# Patient Record
Sex: FEMALE | Race: WHITE | HISPANIC OR LATINO | Employment: UNEMPLOYED | URBAN - METROPOLITAN AREA
[De-identification: names, ages, dates, MRNs, and addresses within clinical notes are randomized per-mention and may not be internally consistent; named-entity substitution may affect disease eponyms.]

---

## 2018-05-01 ENCOUNTER — HISTORICAL (OUTPATIENT)
Dept: INTERNAL MEDICINE | Facility: CLINIC | Age: 28
End: 2018-05-01

## 2018-05-01 LAB
ABS NEUT (OLG): 4.27 X10(3)/MCL (ref 2.1–9.2)
ALBUMIN SERPL-MCNC: 4 GM/DL (ref 3.4–5)
ALBUMIN/GLOB SERPL: 1 RATIO (ref 1–2)
ALP SERPL-CCNC: 85 UNIT/L (ref 45–117)
ALT SERPL-CCNC: 25 UNIT/L (ref 12–78)
APPEARANCE, UA: CLEAR
AST SERPL-CCNC: 19 UNIT/L (ref 15–37)
BACTERIA #/AREA URNS AUTO: ABNORMAL /[HPF]
BASOPHILS # BLD AUTO: 0.05 X10(3)/MCL
BASOPHILS NFR BLD AUTO: 1 %
BILIRUB SERPL-MCNC: 0.5 MG/DL (ref 0.2–1)
BILIRUB UR QL STRIP: NEGATIVE
BILIRUBIN DIRECT+TOT PNL SERPL-MCNC: 0.2 MG/DL
BILIRUBIN DIRECT+TOT PNL SERPL-MCNC: 0.3 MG/DL
BUN SERPL-MCNC: 9 MG/DL (ref 7–18)
CALCIUM SERPL-MCNC: 8.8 MG/DL (ref 8.5–10.1)
CHLORIDE SERPL-SCNC: 103 MMOL/L (ref 98–107)
CHOLEST SERPL-MCNC: 145 MG/DL
CHOLEST/HDLC SERPL: 3 {RATIO} (ref 0–4.4)
CO2 SERPL-SCNC: 26 MMOL/L (ref 21–32)
COLOR UR: ABNORMAL
CREAT SERPL-MCNC: 0.6 MG/DL (ref 0.6–1.3)
DEPRECATED CALCIDIOL+CALCIFEROL SERPL-MC: 29.79 NG/ML (ref 30–80)
EOSINOPHIL # BLD AUTO: 0.09 10*3/UL
EOSINOPHIL NFR BLD AUTO: 1 %
ERYTHROCYTE [DISTWIDTH] IN BLOOD BY AUTOMATED COUNT: 12.1 % (ref 11.5–14.5)
EST. AVERAGE GLUCOSE BLD GHB EST-MCNC: 91 MG/DL
GLOBULIN SER-MCNC: 3.8 GM/ML (ref 2.3–3.5)
GLUCOSE (UA): NORMAL
GLUCOSE SERPL-MCNC: 83 MG/DL (ref 74–106)
HBA1C MFR BLD: 4.8 % (ref 4.2–6.3)
HCT VFR BLD AUTO: 43.2 % (ref 35–46)
HDLC SERPL-MCNC: 49 MG/DL
HGB BLD-MCNC: 14.4 GM/DL (ref 12–16)
HGB UR QL STRIP: 0.03 MG/DL
HIV 1+2 AB+HIV1 P24 AG SERPL QL IA: NONREACTIVE
HYALINE CASTS #/AREA URNS LPF: ABNORMAL /[LPF]
IMM GRANULOCYTES # BLD AUTO: 0.01 10*3/UL
IMM GRANULOCYTES NFR BLD AUTO: 0 %
KETONES UR QL STRIP: NEGATIVE
LDLC SERPL CALC-MCNC: 80 MG/DL (ref 0–130)
LEUKOCYTE ESTERASE UR QL STRIP: NEGATIVE
LYMPHOCYTES # BLD AUTO: 2.06 X10(3)/MCL
LYMPHOCYTES NFR BLD AUTO: 30 % (ref 13–40)
MCH RBC QN AUTO: 32.7 PG (ref 26–34)
MCHC RBC AUTO-ENTMCNC: 33.3 GM/DL (ref 31–37)
MCV RBC AUTO: 98 FL (ref 80–100)
MONOCYTES # BLD AUTO: 0.39 X10(3)/MCL
MONOCYTES NFR BLD AUTO: 6 % (ref 4–12)
NEUTROPHILS # BLD AUTO: 4.27 X10(3)/MCL
NEUTROPHILS NFR BLD AUTO: 62 X10(3)/MCL
NITRITE UR QL STRIP: NEGATIVE
PH UR STRIP: 6.5 [PH] (ref 4.5–8)
PLATELET # BLD AUTO: 264 X10(3)/MCL (ref 130–400)
PMV BLD AUTO: 10.7 FL (ref 7.4–10.4)
POTASSIUM SERPL-SCNC: 3.9 MMOL/L (ref 3.5–5.1)
PROT SERPL-MCNC: 7.8 GM/DL (ref 6.4–8.2)
PROT UR QL STRIP: NEGATIVE
RBC # BLD AUTO: 4.41 X10(6)/MCL (ref 4–5.2)
RBC #/AREA URNS AUTO: ABNORMAL /[HPF]
SODIUM SERPL-SCNC: 135 MMOL/L (ref 136–145)
SP GR UR STRIP: 1 (ref 1–1.03)
SQUAMOUS #/AREA URNS LPF: ABNORMAL /[LPF]
T4 SERPL-MCNC: 9.8 MCG/DL (ref 4.8–13.9)
TRIGL SERPL-MCNC: 80 MG/DL
TSH SERPL-ACNC: 1.11 MIU/L (ref 0.36–3.74)
UROBILINOGEN UR STRIP-ACNC: NORMAL
VLDLC SERPL CALC-MCNC: 16 MG/DL
WBC # SPEC AUTO: 6.9 X10(3)/MCL (ref 4.5–11)
WBC #/AREA URNS AUTO: ABNORMAL /HPF

## 2019-02-06 LAB — POC BETA-HCG (QUAL): NEGATIVE

## 2019-08-29 ENCOUNTER — HISTORICAL (OUTPATIENT)
Dept: RADIOLOGY | Facility: HOSPITAL | Age: 29
End: 2019-08-29

## 2019-12-20 ENCOUNTER — HISTORICAL (OUTPATIENT)
Dept: ADMINISTRATIVE | Facility: HOSPITAL | Age: 29
End: 2019-12-20

## 2019-12-23 LAB — FINAL CULTURE: NORMAL

## 2020-02-03 ENCOUNTER — HISTORICAL (OUTPATIENT)
Dept: RADIOLOGY | Facility: HOSPITAL | Age: 30
End: 2020-02-03

## 2020-04-16 ENCOUNTER — HISTORICAL (OUTPATIENT)
Dept: RADIOLOGY | Facility: HOSPITAL | Age: 30
End: 2020-04-16

## 2020-05-05 ENCOUNTER — HISTORICAL (OUTPATIENT)
Dept: ADMINISTRATIVE | Facility: HOSPITAL | Age: 30
End: 2020-05-05

## 2020-05-05 ENCOUNTER — HISTORICAL (OUTPATIENT)
Dept: INTERNAL MEDICINE | Facility: CLINIC | Age: 30
End: 2020-05-05

## 2020-05-05 LAB
ABS NEUT (OLG): 3.98 X10(3)/MCL (ref 2.1–9.2)
ALBUMIN SERPL-MCNC: 4.1 GM/DL (ref 3.4–5)
ALBUMIN/GLOB SERPL: 0.9 RATIO (ref 1.1–2)
ALP SERPL-CCNC: 83 UNIT/L (ref 45–117)
ALT SERPL-CCNC: 23 UNIT/L (ref 12–78)
APPEARANCE, UA: CLEAR
AST SERPL-CCNC: 19 UNIT/L (ref 15–37)
BACTERIA #/AREA URNS AUTO: ABNORMAL /HPF
BASOPHILS # BLD AUTO: 0 X10(3)/MCL (ref 0–0.2)
BASOPHILS NFR BLD AUTO: 1 %
BILIRUB SERPL-MCNC: 0.7 MG/DL (ref 0.2–1)
BILIRUB UR QL STRIP: NEGATIVE
BILIRUBIN DIRECT+TOT PNL SERPL-MCNC: 0.1 MG/DL (ref 0–0.2)
BILIRUBIN DIRECT+TOT PNL SERPL-MCNC: 0.6 MG/DL
BUN SERPL-MCNC: 11 MG/DL (ref 7–18)
CALCIUM SERPL-MCNC: 9.2 MG/DL (ref 8.5–10.1)
CHLORIDE SERPL-SCNC: 107 MMOL/L (ref 98–107)
CHOLEST SERPL-MCNC: 200 MG/DL
CHOLEST/HDLC SERPL: 3.4 {RATIO} (ref 0–4.4)
CO2 SERPL-SCNC: 25 MMOL/L (ref 21–32)
COLOR UR: YELLOW
CREAT SERPL-MCNC: 0.7 MG/DL (ref 0.6–1.3)
DEPRECATED CALCIDIOL+CALCIFEROL SERPL-MC: 31.1 NG/ML (ref 30–80)
EOSINOPHIL # BLD AUTO: 0.1 X10(3)/MCL (ref 0–0.9)
EOSINOPHIL NFR BLD AUTO: 1 %
ERYTHROCYTE [DISTWIDTH] IN BLOOD BY AUTOMATED COUNT: 12.5 % (ref 11.5–14.5)
GLOBULIN SER-MCNC: 4.5 GM/ML (ref 2.3–3.5)
GLUCOSE (UA): NEGATIVE
GLUCOSE SERPL-MCNC: 92 MG/DL (ref 74–106)
HCT VFR BLD AUTO: 44.4 % (ref 35–46)
HDLC SERPL-MCNC: 59 MG/DL (ref 40–59)
HGB BLD-MCNC: 14.7 GM/DL (ref 12–16)
HGB UR QL STRIP: 0.03 MG/DL
HIV 1+2 AB+HIV1 P24 AG SERPL QL IA: NONREACTIVE
HYALINE CASTS #/AREA URNS LPF: ABNORMAL /LPF
IMM GRANULOCYTES # BLD AUTO: 0.01 10*3/UL
IMM GRANULOCYTES NFR BLD AUTO: 0 %
KETONES UR QL STRIP: NEGATIVE
LDLC SERPL CALC-MCNC: 125 MG/DL
LEUKOCYTE ESTERASE UR QL STRIP: 75 LEU/UL
LYMPHOCYTES # BLD AUTO: 1.8 X10(3)/MCL (ref 0.6–4.6)
LYMPHOCYTES NFR BLD AUTO: 28 %
MCH RBC QN AUTO: 32.4 PG (ref 26–34)
MCHC RBC AUTO-ENTMCNC: 33.1 GM/DL (ref 31–37)
MCV RBC AUTO: 97.8 FL (ref 80–100)
MONOCYTES # BLD AUTO: 0.4 X10(3)/MCL (ref 0.1–1.3)
MONOCYTES NFR BLD AUTO: 7 %
NEUTROPHILS # BLD AUTO: 3.98 X10(3)/MCL (ref 2.1–9.2)
NEUTROPHILS NFR BLD AUTO: 62 %
NITRITE UR QL STRIP: NEGATIVE
PH UR STRIP: 7 [PH] (ref 4.5–8)
PLATELET # BLD AUTO: 237 X10(3)/MCL (ref 130–400)
PMV BLD AUTO: 10 FL (ref 7.4–10.4)
POTASSIUM SERPL-SCNC: 3.8 MMOL/L (ref 3.5–5.1)
PROT SERPL-MCNC: 8.6 GM/DL (ref 6.4–8.2)
PROT UR QL STRIP: 10 MG/DL
RBC # BLD AUTO: 4.54 X10(6)/MCL (ref 4–5.2)
RBC #/AREA URNS AUTO: ABNORMAL /HPF
SODIUM SERPL-SCNC: 136 MMOL/L (ref 136–145)
SP GR UR STRIP: 1.02 (ref 1–1.03)
SQUAMOUS #/AREA URNS LPF: ABNORMAL /LPF
T4 SERPL-MCNC: 9.5 MCG/DL (ref 4.8–13.9)
TRIGL SERPL-MCNC: 82 MG/DL
TSH SERPL-ACNC: 1.51 MIU/L (ref 0.36–3.74)
UROBILINOGEN UR STRIP-ACNC: NORMAL
VLDLC SERPL CALC-MCNC: 16 MG/DL
WBC # SPEC AUTO: 6.4 X10(3)/MCL (ref 4.5–11)
WBC #/AREA URNS AUTO: ABNORMAL /HPF

## 2020-05-07 LAB — FINAL CULTURE: NO GROWTH

## 2020-05-08 ENCOUNTER — HOSPITAL ENCOUNTER (OUTPATIENT)
Dept: MEDSURG UNIT | Facility: HOSPITAL | Age: 30
End: 2020-05-09
Attending: OTOLARYNGOLOGY | Admitting: OTOLARYNGOLOGY

## 2020-05-08 LAB
ALBUMIN SERPL-MCNC: 3.6 GM/DL (ref 3.4–5)
CALCIUM SERPL-MCNC: 8.1 MG/DL (ref 8.5–10.1)
MAGNESIUM SERPL-MCNC: 1.8 MG/DL (ref 1.6–2.6)
PTH-INTACT SERPL-MCNC: <4 PG/ML (ref 18.4–80.1)

## 2020-05-09 LAB
ALBUMIN SERPL-MCNC: 3.2 GM/DL (ref 3.4–5)
BUN SERPL-MCNC: 8 MG/DL (ref 7–18)
CALCIUM SERPL-MCNC: 7.5 MG/DL (ref 8.5–10.1)
CALCIUM SERPL-MCNC: 7.7 MG/DL (ref 8.5–10.1)
CHLORIDE SERPL-SCNC: 104 MMOL/L (ref 98–107)
CO2 SERPL-SCNC: 28 MMOL/L (ref 21–32)
CREAT SERPL-MCNC: 0.7 MG/DL (ref 0.6–1.3)
CREAT/UREA NIT SERPL: 11
GLUCOSE SERPL-MCNC: 101 MG/DL (ref 74–106)
POTASSIUM SERPL-SCNC: 3.6 MMOL/L (ref 3.5–5.1)
SODIUM SERPL-SCNC: 140 MMOL/L (ref 136–145)

## 2020-05-12 ENCOUNTER — HISTORICAL (OUTPATIENT)
Dept: ADMINISTRATIVE | Facility: HOSPITAL | Age: 30
End: 2020-05-12

## 2020-05-12 LAB
ALBUMIN SERPL-MCNC: 3.9 GM/DL (ref 3.4–5)
CALCIUM SERPL-MCNC: 7.5 MG/DL (ref 8.5–10.1)
MAGNESIUM SERPL-MCNC: 2 MG/DL (ref 1.6–2.6)
PHOSPHATE SERPL-MCNC: 5.3 MG/DL (ref 2.5–4.9)

## 2020-06-18 ENCOUNTER — HISTORICAL (OUTPATIENT)
Dept: LAB | Facility: HOSPITAL | Age: 30
End: 2020-06-18

## 2020-06-18 LAB
ALBUMIN SERPL-MCNC: 3.8 GM/DL (ref 3.4–5)
BUN SERPL-MCNC: 8 MG/DL (ref 7–18)
CALCIUM SERPL-MCNC: 7.5 MG/DL (ref 8.5–10.1)
CHLORIDE SERPL-SCNC: 103 MMOL/L (ref 98–107)
CO2 SERPL-SCNC: 31 MMOL/L (ref 21–32)
CREAT SERPL-MCNC: 0.6 MG/DL (ref 0.6–1.3)
GLUCOSE SERPL-MCNC: 87 MG/DL (ref 74–106)
PHOSPHATE SERPL-MCNC: 4 MG/DL (ref 2.5–4.9)
POTASSIUM SERPL-SCNC: 3.5 MMOL/L (ref 3.5–5.1)
SODIUM SERPL-SCNC: 138 MMOL/L (ref 136–145)
TSH SERPL-ACNC: 0.38 MIU/L (ref 0.36–3.74)

## 2020-06-22 ENCOUNTER — HISTORICAL (OUTPATIENT)
Dept: ADMINISTRATIVE | Facility: HOSPITAL | Age: 30
End: 2020-06-22

## 2020-06-22 LAB
ABS NEUT (OLG): 4.27 X10(3)/MCL (ref 2.1–9.2)
ALBUMIN SERPL-MCNC: 3.9 GM/DL (ref 3.4–5)
ALBUMIN/GLOB SERPL: 1 RATIO (ref 1.1–2)
ALP SERPL-CCNC: 93 UNIT/L (ref 45–117)
ALT SERPL-CCNC: 26 UNIT/L (ref 12–78)
APPEARANCE, UA: CLEAR
AST SERPL-CCNC: 17 UNIT/L (ref 15–37)
BACTERIA #/AREA URNS AUTO: ABNORMAL /HPF
BASOPHILS # BLD AUTO: 0 X10(3)/MCL (ref 0–0.2)
BASOPHILS NFR BLD AUTO: 1 %
BILIRUB SERPL-MCNC: 0.5 MG/DL (ref 0.2–1)
BILIRUB UR QL STRIP: ABNORMAL MG/DL
BILIRUBIN DIRECT+TOT PNL SERPL-MCNC: 0.1 MG/DL (ref 0–0.2)
BILIRUBIN DIRECT+TOT PNL SERPL-MCNC: 0.4 MG/DL
BUN SERPL-MCNC: 9 MG/DL (ref 7–18)
CALCIUM SERPL-MCNC: 7.4 MG/DL (ref 8.5–10.1)
CHLORIDE SERPL-SCNC: 105 MMOL/L (ref 98–107)
CO2 SERPL-SCNC: 28 MMOL/L (ref 21–32)
COLOR UR: YELLOW
CREAT SERPL-MCNC: 0.7 MG/DL (ref 0.6–1.3)
EOSINOPHIL # BLD AUTO: 0.2 X10(3)/MCL (ref 0–0.9)
EOSINOPHIL NFR BLD AUTO: 3 %
ERYTHROCYTE [DISTWIDTH] IN BLOOD BY AUTOMATED COUNT: 12.1 % (ref 11.5–14.5)
GLOBULIN SER-MCNC: 4 GM/ML (ref 2.3–3.5)
GLUCOSE (UA): ABNORMAL MG/DL
GLUCOSE SERPL-MCNC: 92 MG/DL (ref 74–106)
HCT VFR BLD AUTO: 44.1 % (ref 35–46)
HGB BLD-MCNC: 14.5 GM/DL (ref 12–16)
HGB UR QL STRIP: 0.06 MG/DL
HYALINE CASTS #/AREA URNS LPF: ABNORMAL /LPF
IMM GRANULOCYTES # BLD AUTO: 0.01 10*3/UL
IMM GRANULOCYTES NFR BLD AUTO: 0 %
KETONES UR QL STRIP: ABNORMAL MG/DL
LEUKOCYTE ESTERASE UR QL STRIP: 500 LEU/UL
LYMPHOCYTES # BLD AUTO: 2 X10(3)/MCL (ref 0.6–4.6)
LYMPHOCYTES NFR BLD AUTO: 29 %
MCH RBC QN AUTO: 31.9 PG (ref 26–34)
MCHC RBC AUTO-ENTMCNC: 32.9 GM/DL (ref 31–37)
MCV RBC AUTO: 96.9 FL (ref 80–100)
MONOCYTES # BLD AUTO: 0.6 X10(3)/MCL (ref 0.1–1.3)
MONOCYTES NFR BLD AUTO: 8 %
NEUTROPHILS # BLD AUTO: 4.27 X10(3)/MCL (ref 2.1–9.2)
NEUTROPHILS NFR BLD AUTO: 60 %
NITRITE UR QL STRIP: ABNORMAL
PH UR STRIP: 6 [PH] (ref 4.5–8)
PHOSPHATE SERPL-MCNC: 5 MG/DL (ref 2.5–4.9)
PLATELET # BLD AUTO: 261 X10(3)/MCL (ref 130–400)
PMV BLD AUTO: 10.2 FL (ref 7.4–10.4)
POTASSIUM SERPL-SCNC: 3.5 MMOL/L (ref 3.5–5.1)
PROT SERPL-MCNC: 7.9 GM/DL (ref 6.4–8.2)
PROT UR QL STRIP: 20 MG/DL
RBC # BLD AUTO: 4.55 X10(6)/MCL (ref 4–5.2)
RBC #/AREA URNS AUTO: ABNORMAL /HPF
SODIUM SERPL-SCNC: 138 MMOL/L (ref 136–145)
SP GR UR STRIP: 1.03 (ref 1–1.03)
SQUAMOUS #/AREA URNS LPF: ABNORMAL /LPF
T3FREE SERPL-MCNC: 3.34 PG/ML (ref 1.71–3.71)
T4 FREE SERPL-MCNC: 1 NG/DL (ref 0.7–1.48)
TSH SERPL-ACNC: 0.54 MIU/L (ref 0.36–3.74)
UROBILINOGEN UR STRIP-ACNC: NORMAL
WBC # SPEC AUTO: 7.1 X10(3)/MCL (ref 4.5–11)
WBC #/AREA URNS AUTO: ABNORMAL /HPF

## 2020-06-24 LAB — FINAL CULTURE: NO GROWTH

## 2020-06-29 ENCOUNTER — HISTORICAL (OUTPATIENT)
Dept: ADMINISTRATIVE | Facility: HOSPITAL | Age: 30
End: 2020-06-29

## 2020-06-29 LAB
ALBUMIN SERPL-MCNC: 4 GM/DL (ref 3.4–5)
BUN SERPL-MCNC: 9 MG/DL (ref 7–18)
CALCIUM SERPL-MCNC: 8.2 MG/DL (ref 8.5–10.1)
CHLORIDE SERPL-SCNC: 103 MMOL/L (ref 98–107)
CO2 SERPL-SCNC: 28 MMOL/L (ref 21–32)
CREAT SERPL-MCNC: 0.6 MG/DL (ref 0.6–1.3)
GLUCOSE SERPL-MCNC: 75 MG/DL (ref 74–106)
PHOSPHATE SERPL-MCNC: 3.6 MG/DL (ref 2.5–4.9)
POTASSIUM SERPL-SCNC: 4 MMOL/L (ref 3.5–5.1)
SODIUM SERPL-SCNC: 137 MMOL/L (ref 136–145)

## 2020-07-09 ENCOUNTER — HISTORICAL (OUTPATIENT)
Dept: INTERNAL MEDICINE | Facility: CLINIC | Age: 30
End: 2020-07-09

## 2020-07-09 LAB
APPEARANCE, UA: CLEAR
BACTERIA #/AREA URNS AUTO: ABNORMAL /HPF
BILIRUB UR QL STRIP: NEGATIVE
COLOR UR: ABNORMAL
GLUCOSE (UA): NEGATIVE
HGB UR QL STRIP: 0.03 MG/DL
HYALINE CASTS #/AREA URNS LPF: ABNORMAL /LPF
KETONES UR QL STRIP: NEGATIVE
LEUKOCYTE ESTERASE UR QL STRIP: 250 LEU/UL
NITRITE UR QL STRIP: NEGATIVE
PH UR STRIP: 7.5 [PH] (ref 4.5–8)
PROT UR QL STRIP: NEGATIVE
RBC #/AREA URNS AUTO: ABNORMAL /HPF
SP GR UR STRIP: 1.01 (ref 1–1.03)
SQUAMOUS #/AREA URNS LPF: ABNORMAL /LPF
UROBILINOGEN UR STRIP-ACNC: NORMAL
WBC #/AREA URNS AUTO: ABNORMAL /HPF

## 2020-07-11 LAB — FINAL CULTURE: NORMAL

## 2020-07-12 ENCOUNTER — HISTORICAL (OUTPATIENT)
Dept: ADMINISTRATIVE | Facility: HOSPITAL | Age: 30
End: 2020-07-12

## 2020-07-12 LAB
CALCIUM 24H UR-MCNC: 228 MG/24HR (ref 0–250)
CREAT 24H UR-MCNC: 0.9 GM/24HR (ref 0.6–2.1)
CREAT UR-MCNC: 30 MG/DL (ref 47–110)

## 2020-08-25 ENCOUNTER — HISTORICAL (OUTPATIENT)
Dept: LAB | Facility: HOSPITAL | Age: 30
End: 2020-08-25

## 2020-08-25 ENCOUNTER — HISTORICAL (OUTPATIENT)
Dept: ADMINISTRATIVE | Facility: HOSPITAL | Age: 30
End: 2020-08-25

## 2020-08-25 LAB
ALBUMIN SERPL-MCNC: 3.8 GM/DL (ref 3.4–5)
B-HCG SERPL QL: NEGATIVE
BUN SERPL-MCNC: 10 MG/DL (ref 7–18)
CALCIUM SERPL-MCNC: 7.5 MG/DL (ref 8.5–10.1)
CHLORIDE SERPL-SCNC: 104 MMOL/L (ref 98–107)
CO2 SERPL-SCNC: 27 MMOL/L (ref 21–32)
CREAT SERPL-MCNC: 0.7 MG/DL (ref 0.6–1.3)
GLUCOSE SERPL-MCNC: 87 MG/DL (ref 74–106)
PHOSPHATE SERPL-MCNC: 3.5 MG/DL (ref 2.5–4.9)
POTASSIUM SERPL-SCNC: 3.9 MMOL/L (ref 3.5–5.1)
SODIUM SERPL-SCNC: 139 MMOL/L (ref 136–145)
T4 FREE SERPL-MCNC: 1.22 NG/DL (ref 0.76–1.46)
TSH SERPL-ACNC: 0.09 MIU/L (ref 0.36–3.74)

## 2020-09-03 LAB
BILIRUB SERPL-MCNC: NEGATIVE MG/DL
BLOOD URINE, POC: NORMAL
CLARITY, POC UA: CLEAR
COLOR, POC UA: YELLOW
GLUCOSE UR QL STRIP: NEGATIVE
KETONES UR QL STRIP: NEGATIVE
LEUKOCYTE EST, POC UA: NEGATIVE
NITRITE, POC UA: NEGATIVE
PH, POC UA: 7.5
POC BETA-HCG (QUAL): NEGATIVE
PROTEIN, POC: NEGATIVE
SPECIFIC GRAVITY, POC UA: 1.01
UROBILINOGEN, POC UA: NORMAL

## 2020-10-26 ENCOUNTER — HISTORICAL (OUTPATIENT)
Dept: ADMINISTRATIVE | Facility: HOSPITAL | Age: 30
End: 2020-10-26

## 2020-10-26 LAB
ALBUMIN SERPL-MCNC: 4.1 GM/DL (ref 3.5–5)
BUN SERPL-MCNC: 13 MG/DL (ref 7–18.7)
CALCIUM SERPL-MCNC: 8 MG/DL (ref 8.4–10.2)
CHLORIDE SERPL-SCNC: 103 MMOL/L (ref 98–107)
CO2 SERPL-SCNC: 29 MMOL/L (ref 22–29)
CREAT SERPL-MCNC: 0.8 MG/DL (ref 0.55–1.02)
GLUCOSE SERPL-MCNC: 92 MG/DL (ref 74–100)
PHOSPHATE SERPL-MCNC: 3.7 MG/DL (ref 2.3–4.7)
POTASSIUM SERPL-SCNC: 4.3 MMOL/L (ref 3.5–5.1)
SODIUM SERPL-SCNC: 138 MMOL/L (ref 136–145)
T4 FREE SERPL-MCNC: 1.15 NG/DL (ref 0.7–1.48)
TSH SERPL-ACNC: 0.16 UIU/ML (ref 0.35–4.94)

## 2020-11-03 LAB
CHLAMYDIA TRACHOMATIS CULTURE: NEGATIVE
NEISSERIA GONORRHOEAE (GC) CULTURE: NEGATIVE
PAP RECOMMENDATION EXT: NORMAL
PAP SMEAR: NORMAL

## 2021-03-30 ENCOUNTER — HISTORICAL (OUTPATIENT)
Dept: RADIOLOGY | Facility: HOSPITAL | Age: 31
End: 2021-03-30

## 2021-04-01 ENCOUNTER — HISTORICAL (OUTPATIENT)
Dept: ADMINISTRATIVE | Facility: HOSPITAL | Age: 31
End: 2021-04-01

## 2021-04-01 LAB
ALBUMIN SERPL-MCNC: 3.5 GM/DL (ref 3.5–5)
BUN SERPL-MCNC: 10.1 MG/DL (ref 7–18.7)
CALCIUM SERPL-MCNC: 9.7 MG/DL (ref 8.4–10.2)
CHLORIDE SERPL-SCNC: 99 MMOL/L (ref 98–107)
CO2 SERPL-SCNC: 27 MMOL/L (ref 22–29)
CREAT SERPL-MCNC: 0.71 MG/DL (ref 0.55–1.02)
DEPRECATED CALCIDIOL+CALCIFEROL SERPL-MC: 57.3 NG/ML (ref 30–80)
GLUCOSE SERPL-MCNC: 75 MG/DL (ref 74–100)
MAGNESIUM SERPL-MCNC: 2 MG/DL (ref 1.6–2.6)
PHOSPHATE SERPL-MCNC: 3.9 MG/DL (ref 2.3–4.7)
POTASSIUM SERPL-SCNC: 3.6 MMOL/L (ref 3.5–5.1)
SODIUM SERPL-SCNC: 136 MMOL/L (ref 136–145)
T3FREE SERPL-MCNC: 2.57 PG/ML (ref 1.71–3.71)
T4 FREE SERPL-MCNC: 0.87 NG/DL (ref 0.7–1.48)
T4 SERPL-MCNC: 9.52 UG/DL (ref 4.87–11.72)
TSH SERPL-ACNC: 1.27 UIU/ML (ref 0.35–4.94)

## 2021-04-29 ENCOUNTER — HISTORICAL (OUTPATIENT)
Dept: ADMINISTRATIVE | Facility: HOSPITAL | Age: 31
End: 2021-04-29

## 2021-04-29 LAB
ALBUMIN SERPL-MCNC: 3.2 GM/DL (ref 3.5–5)
BUN SERPL-MCNC: 6.3 MG/DL (ref 7–18.7)
CALCIUM SERPL-MCNC: 8.1 MG/DL (ref 8.4–10.2)
CHLORIDE SERPL-SCNC: 103 MMOL/L (ref 98–107)
CO2 SERPL-SCNC: 24 MMOL/L (ref 22–29)
CREAT SERPL-MCNC: 0.59 MG/DL (ref 0.55–1.02)
GLUCOSE SERPL-MCNC: 102 MG/DL (ref 74–100)
PHOSPHATE SERPL-MCNC: 3.6 MG/DL (ref 2.3–4.7)
POTASSIUM SERPL-SCNC: 3.4 MMOL/L (ref 3.5–5.1)
SODIUM SERPL-SCNC: 136 MMOL/L (ref 136–145)
T3FREE SERPL-MCNC: 3.07 PG/ML (ref 1.71–3.71)
T4 FREE SERPL-MCNC: 0.85 NG/DL (ref 0.7–1.48)
T4 SERPL-MCNC: 11.17 UG/DL (ref 4.87–11.72)
TSH SERPL-ACNC: 0.85 UIU/ML (ref 0.35–4.94)

## 2021-05-25 ENCOUNTER — HISTORICAL (OUTPATIENT)
Dept: ADMINISTRATIVE | Facility: HOSPITAL | Age: 31
End: 2021-05-25

## 2021-05-25 LAB
ABS NEUT (OLG): 8.28 X10(3)/MCL (ref 2.1–9.2)
BASOPHILS # BLD AUTO: 0 X10(3)/MCL (ref 0–0.2)
BASOPHILS NFR BLD AUTO: 0 %
EOSINOPHIL # BLD AUTO: 0.1 X10(3)/MCL (ref 0–0.9)
EOSINOPHIL NFR BLD AUTO: 1 %
ERYTHROCYTE [DISTWIDTH] IN BLOOD BY AUTOMATED COUNT: 13.2 % (ref 11.5–17)
GROUP & RH: NORMAL
HBV SURFACE AG SERPL QL IA: NONREACTIVE
HCT VFR BLD AUTO: 38.6 % (ref 37–47)
HCV AB SERPL QL IA: NONREACTIVE
HGB BLD-MCNC: 12.3 GM/DL (ref 12–16)
HIV 1+2 AB+HIV1 P24 AG SERPL QL IA: NONREACTIVE
LYMPHOCYTES # BLD AUTO: 1.6 X10(3)/MCL (ref 0.6–4.6)
LYMPHOCYTES NFR BLD AUTO: 15 %
MCH RBC QN AUTO: 32.6 PG (ref 27–31)
MCHC RBC AUTO-ENTMCNC: 31.9 GM/DL (ref 33–36)
MCV RBC AUTO: 102.4 FL (ref 80–94)
MONOCYTES # BLD AUTO: 0.6 X10(3)/MCL (ref 0.1–1.3)
MONOCYTES NFR BLD AUTO: 6 %
NEUTROPHILS # BLD AUTO: 8.28 X10(3)/MCL (ref 2.1–9.2)
NEUTROPHILS NFR BLD AUTO: 77 %
PLATELET # BLD AUTO: 241 X10(3)/MCL (ref 130–400)
PMV BLD AUTO: 10.9 FL (ref 9.4–12.4)
RBC # BLD AUTO: 3.77 X10(6)/MCL (ref 4.2–5.4)
T PALLIDUM AB SER QL: NONREACTIVE
TSH SERPL-ACNC: 1.22 UIU/ML (ref 0.35–4.94)
WBC # SPEC AUTO: 10.7 X10(3)/MCL (ref 4.5–11.5)

## 2021-05-27 LAB — FINAL CULTURE: NO GROWTH

## 2021-06-22 ENCOUNTER — HISTORICAL (OUTPATIENT)
Dept: ADMINISTRATIVE | Facility: HOSPITAL | Age: 31
End: 2021-06-22

## 2021-06-22 LAB
ABS NEUT (OLG): 6.99 X10(3)/MCL (ref 2.1–9.2)
ALBUMIN SERPL-MCNC: 2.7 GM/DL (ref 3.5–5)
ALBUMIN/GLOB SERPL: 0.7 RATIO (ref 1.1–2)
ALP SERPL-CCNC: 61 UNIT/L (ref 40–150)
ALT SERPL-CCNC: 14 UNIT/L (ref 0–55)
AST SERPL-CCNC: 15 UNIT/L (ref 5–34)
BASOPHILS # BLD AUTO: 0 X10(3)/MCL (ref 0–0.2)
BASOPHILS NFR BLD AUTO: 0 %
BILIRUB SERPL-MCNC: 0.3 MG/DL
BILIRUBIN DIRECT+TOT PNL SERPL-MCNC: 0.1 MG/DL (ref 0–0.5)
BILIRUBIN DIRECT+TOT PNL SERPL-MCNC: 0.2 MG/DL (ref 0–0.8)
BUN SERPL-MCNC: 6.3 MG/DL (ref 7–18.7)
CALCIUM SERPL-MCNC: 8.5 MG/DL (ref 8.4–10.2)
CHLORIDE SERPL-SCNC: 101 MMOL/L (ref 98–107)
CO2 SERPL-SCNC: 27 MMOL/L (ref 22–29)
CREAT SERPL-MCNC: 0.54 MG/DL (ref 0.55–1.02)
EOSINOPHIL # BLD AUTO: 0.2 X10(3)/MCL (ref 0–0.9)
EOSINOPHIL NFR BLD AUTO: 2 %
ERYTHROCYTE [DISTWIDTH] IN BLOOD BY AUTOMATED COUNT: 13.4 % (ref 11.5–17)
GLOBULIN SER-MCNC: 3.9 GM/DL (ref 2.4–3.5)
GLUCOSE SERPL-MCNC: 77 MG/DL (ref 74–100)
HCT VFR BLD AUTO: 39.4 % (ref 37–47)
HGB BLD-MCNC: 13.1 GM/DL (ref 12–16)
IMM GRANULOCYTES # BLD AUTO: 0.06 10*3/UL
IMM GRANULOCYTES NFR BLD AUTO: 1 %
LYMPHOCYTES # BLD AUTO: 1.7 X10(3)/MCL (ref 0.6–4.6)
LYMPHOCYTES NFR BLD AUTO: 18 %
MCH RBC QN AUTO: 33.9 PG (ref 27–31)
MCHC RBC AUTO-ENTMCNC: 33.2 GM/DL (ref 33–36)
MCV RBC AUTO: 101.8 FL (ref 80–94)
MONOCYTES # BLD AUTO: 0.6 X10(3)/MCL (ref 0.1–1.3)
MONOCYTES NFR BLD AUTO: 6 %
NEUTROPHILS # BLD AUTO: 6.99 X10(3)/MCL (ref 2.1–9.2)
NEUTROPHILS NFR BLD AUTO: 73 %
PLATELET # BLD AUTO: 221 X10(3)/MCL (ref 130–400)
PMV BLD AUTO: 10.7 FL (ref 9.4–12.4)
POTASSIUM SERPL-SCNC: 3.9 MMOL/L (ref 3.5–5.1)
PROT SERPL-MCNC: 6.6 GM/DL (ref 6.4–8.3)
RBC # BLD AUTO: 3.87 X10(6)/MCL (ref 4.2–5.4)
SODIUM SERPL-SCNC: 139 MMOL/L (ref 136–145)
WBC # SPEC AUTO: 9.6 X10(3)/MCL (ref 4.5–11.5)

## 2021-08-12 ENCOUNTER — HISTORICAL (OUTPATIENT)
Dept: ENDOCRINOLOGY | Facility: CLINIC | Age: 31
End: 2021-08-12

## 2021-08-12 LAB
ALBUMIN SERPL-MCNC: 2.5 GM/DL (ref 3.5–5)
BUN SERPL-MCNC: 5.9 MG/DL (ref 7–18.7)
CALCIUM SERPL-MCNC: 7.4 MG/DL (ref 8.4–10.2)
CHLORIDE SERPL-SCNC: 105 MMOL/L (ref 98–107)
CO2 SERPL-SCNC: 25 MMOL/L (ref 22–29)
CREAT SERPL-MCNC: 0.56 MG/DL (ref 0.55–1.02)
GLUCOSE SERPL-MCNC: 86 MG/DL (ref 74–100)
PHOSPHATE SERPL-MCNC: 4 MG/DL (ref 2.3–4.7)
POTASSIUM SERPL-SCNC: 3.6 MMOL/L (ref 3.5–5.1)
SODIUM SERPL-SCNC: 138 MMOL/L (ref 136–145)
TSH SERPL-ACNC: 0.98 UIU/ML (ref 0.35–4.94)

## 2021-10-06 ENCOUNTER — HISTORICAL (OUTPATIENT)
Dept: ADMINISTRATIVE | Facility: HOSPITAL | Age: 31
End: 2021-10-06

## 2022-03-31 ENCOUNTER — HISTORICAL (OUTPATIENT)
Dept: ADMINISTRATIVE | Facility: HOSPITAL | Age: 32
End: 2022-03-31

## 2022-03-31 LAB
T3FREE SERPL-MCNC: 3.08 PG/ML (ref 1.58–3.91)
T4 FREE SERPL-MCNC: 1.08 NG/DL (ref 0.7–1.48)
TSH SERPL-ACNC: 0.08 M[IU]/L (ref 0.35–4.94)

## 2022-04-10 ENCOUNTER — HISTORICAL (OUTPATIENT)
Dept: ADMINISTRATIVE | Facility: HOSPITAL | Age: 32
End: 2022-04-10
Payer: MEDICAID

## 2022-04-29 NOTE — OP NOTE
Patient:   Raza Cerda            MRN: [id number]            FIN: [id number]               Age:   29 years     Sex:  Female     :  1990   Associated Diagnoses:   None   Author:   Amy Robles MD       DATE OF SURGERY:    20     ATTENDING PHYSICIAN:  Greg Santoyo Sr     PREOPERATIVE DIAGNOSIS:  Papillary thyroid carcinoma     POSTOPERATIVE DIAGNOSIS:   Papillary thyroid carcinoma     PROCEDURE PERFORMED:  Total thyroidectomy     ANESTHESIA:  General endotracheal anesthesia.     ESTIMATED BLOOD LOSS:  30 mL.     COMPLICATIONS:  None.     SPECIMENS: right thyroid lobe and isthmus sent for frozen, returned as papillary thyroid cancer; left thyroid lobe sent for permanent      FINDINGS: Multinodular right thyroid lobe, solid nodule in left lobe    INDICATION FOR PROCEDURE:  29yF who presented with right thyroid nodule. FNA suspicious for papillary thyroid carcinoma. Left lobe with 1cm solid nodule. Neck u/s did not reveal any central or lateral neck lymphadenopathy. TFTs normal. Risks benefits and alternatives discussed and patient elected to proceed with total thyroidectomy.      PROCEDURE IN DETAIL:  After appropriate witnessed informed consent was obtained, the patient was taken down to the operating room and laid in the supine position.  General endotracheal anesthesia was induced without complications and a NIM tube was placed for NIM monitoring.  Patient was positioned and placed in extension.  A skin crease was identified approximately 2 fingerbreadth above the sternal notch.  A 4 cm incision was designed in the anterior neck skin crease using her previous incision.  This was then infiltrated with 1% lidocaine with 1:100,000 epinephrine.  A total of 3 mL was utilized.  Before proceeding any further, a full timeout was performed identifying the correct patient and operative site.  The neck was then prepped and draped in the usual sterile fashion.      Before starting, a tap test  was performed identifying a functional NIM monitoring system.  Incision was made through the skin and subcutaneous tissue with a #10 blade.  The incision was then continued down inferiorly using the Bovie electrocautery until the investing layer of deep cervical fascia was encountered.  A superior skin flap was raised up to the level of the thyroid notch and an inferior skin flap was raised down to the level of the clavicles and the sternal head.  At this point in time, the skin hooks were used for self-retention retraction.  The midline raphe was then identified and strap muscles were divided.  The incision carried down onto the isthmus of the thyroid.  The trachea was identified superiorly and inferiorly.  Dissection then continued on the thyroid capsule itself and the strap muscles were elevated out laterally. This began on the right side.  Once adequate elevation of the strap muscles was achieved, we turned our attention to the superior pole.  There were 2 superior pole vessels emanating laterally and superiorly.  The harmonic scalpel was used to take down these vessels.  Blunt dissection then carried down inferiorly where the capsule was  from the thyroid parenchyma itself. As the gland was reflected medially the superior parathyroid was identified and preserved.  At this point, the thyroid gland was retracted medially and the capsule was further  from the surrounding tissue.  The carotid was identified in the lateral neck.  A few spreads were made within the Yates and the recurrent laryngeal nerve was identified near the cricothyroid joint.  This was dissected both superiorly and inferiorly, and protected.  The thyroid was then  from the recurrent laryngeal nerve using a Yates dissector.  Dissection continued down inferiorly where the inferior pole was  as close to the thyroid gland as possible. Inferior parathyroid was identified and preserved.  Further retraction  medially was carried out and the Bovie was used to separate the thyroid gland from the anterior tracheal wall.  Once Fishs ligament was adequately , the thyroid isthmus was divided using a Harmonic scalpel and sent for frozen section. This was confirmed as papillary thyroid carcinoma. Attention was turned to the left lobe. Dissection continued on the thyroid capsule itself and the strap muscles were elevated out laterally.  Once adequate elevation of the strap muscles was achieved, we turned our attention to the superior pole.  There were 2 superior pole vessels emanating laterally and superiorly.  The harmonic scalpel was used to take down these vessels.  Blunt dissection then carried down inferiorly where the capsule was  from the thyroid parenchyma itself. As the gland was reflected medially the superior parathyroid was identified and preserved.  At this point, the thyroid gland was retracted medially and the capsule was further  from the surrounding tissue.  The carotid was identified in the lateral neck.  A few spreads were made within the Yates and the recurrent laryngeal nerve was identified near the cricothyroid joint.  This was dissected both superiorly and inferiorly, and protected.  The thyroid was then  from the recurrent laryngeal nerve using a Yates dissector.  Dissection continued down inferiorly where the inferior pole was  as close to the thyroid gland as possible.  Inferior parathyroid was identified and preserved. Further retraction medially was carried out and the Bovie was used to separate the thyroid gland from the anterior tracheal wall until the lobe was freed and sent for permanent specimen. A 10 mm fully-fenestrated CROW drain was placed emanating from the lateral neck.  It was secured using a 2-0 silk suture.  The drain was placed in the thyroid bed.  The strap muscles were reapproximated in the midline with 3-0 Vicryl suture in simple running  fashion.  The platysma was then reapproximated also with 3-0 Vicryl suture in simple interrupted fashion.  Deep dermal sutures were then placed and the skin was then reapproximated using a 4-0 monocryl in running subcuticular fashion.  All counts were correct at the end of the case.  The patient tolerated the procedure well and there were no complications.

## 2022-04-30 VITALS
BODY MASS INDEX: 24.46 KG/M2 | HEIGHT: 69 IN | DIASTOLIC BLOOD PRESSURE: 66 MMHG | OXYGEN SATURATION: 99 % | WEIGHT: 165.13 LBS | SYSTOLIC BLOOD PRESSURE: 123 MMHG

## 2022-05-02 NOTE — HISTORICAL [facility] CERNER
This is a historical note converted from Rene. Formatting and pictures may have been removed.  Please reference Rene for original formatting and attached multimedia. Admit and Discharge Dates  Admit Date: 05/08/2020  Discharge Date: 05/09/2020  Physicians  Attending Physician - Greg Santoyo  Admitting Physician - Greg Santoyo  Primary Care Physician - Luciana NOELP, Machelle  Discharge Diagnosis  Papillary carcinoma of thyroid?[id number]  Surgical Procedures  05/08/2020 - [id number] - Thyroidectomy (ENT)  Immunizations  No immunizations recorded for this visit.  Admission Information  Patient admitted overni tfor observation following TT. Post-op PTH <4, Calcium WNL. Calciums were sequentially drawn with steady findings. She was placed on Rocaltrol and Calcium Carbonate on POD 0. No numbness/tingling. No voice complaints. Eating and drinking without issues. CROW drian >100cc per 24 hours. Left in place and taught logging. Will call Monday AM for output results.  Significant Findings  ambulated, pain controlled, drain appropriate  Time Spent on discharge  10 minutes  Objective  Vitals & Measurements  T:?36.5? ?C (Oral)? TMIN:?36.5? ?C (Oral)? TMAX:?37.6? ?C (Oral)? HR:?86(Peripheral)? RR:?20? BP:?108/67? SpO2:?98%?  Patient Discharge Condition  stable  Discharge Disposition  home   Discharge Medication Reconciliation  Prescribed  acetaminophen-HYDROcodone (acetaminophen-hydrocodone 325 mg-7.5 mg oral tablet)?1 tab(s), Oral, q4hr, PRN pain, moderate  calcitriol (Rocaltrol 0.25 mcg oral capsule)?0.25 mcg, Oral, BID  calcium carbonate (calcium carbonate 500 mg oral tablet, chewable)?1,000 mg, Chewed, TID  levothyroxine (Synthroid 112 mcg (0.112 mg) oral tablet)?0.112 mg, Oral, Daily  Education and Orders Provided  Thyroidectomy  General Anesthesia, Adult  Follow up  Call office to Schedule Appointment  ????  Call ENT clinic on Monday with drain outputs

## 2022-05-23 ENCOUNTER — TELEPHONE (OUTPATIENT)
Dept: ENDOCRINOLOGY | Facility: CLINIC | Age: 32
End: 2022-05-23
Payer: MEDICAID

## 2022-05-23 DIAGNOSIS — E03.9 HYPOTHYROIDISM, UNSPECIFIED TYPE: ICD-10-CM

## 2022-05-23 DIAGNOSIS — E20.9 HYPOPARATHYROIDISM, UNSPECIFIED HYPOPARATHYROIDISM TYPE: Primary | ICD-10-CM

## 2022-05-23 RX ORDER — LEVOTHYROXINE SODIUM 112 UG/1
112 CAPSULE ORAL
COMMUNITY
Start: 2022-01-27 | End: 2022-06-02

## 2022-05-23 RX ORDER — LEVOTHYROXINE SODIUM 50 UG/1
50 CAPSULE ORAL
COMMUNITY
Start: 2022-01-27 | End: 2022-06-02

## 2022-05-23 RX ORDER — CALCITRIOL 0.25 UG/1
0.25 CAPSULE ORAL
COMMUNITY
Start: 2022-01-27 | End: 2022-06-03 | Stop reason: SDUPTHER

## 2022-05-23 NOTE — TELEPHONE ENCOUNTER
----- Message from Evy Rich sent at 5/23/2022  7:45 AM CDT -----  Regarding: Lab orders  #[facility] PT#    Pt called would like lab orders sent to [facility] on [address].      Call back number [phone]

## 2022-06-02 ENCOUNTER — OFFICE VISIT (OUTPATIENT)
Dept: ENDOCRINOLOGY | Facility: CLINIC | Age: 32
End: 2022-06-02
Payer: MEDICAID

## 2022-06-02 VITALS
WEIGHT: 161.13 LBS | RESPIRATION RATE: 20 BRPM | TEMPERATURE: 98 F | HEIGHT: 69 IN | BODY MASS INDEX: 23.87 KG/M2 | HEART RATE: 82 BPM | DIASTOLIC BLOOD PRESSURE: 85 MMHG | SYSTOLIC BLOOD PRESSURE: 131 MMHG

## 2022-06-02 DIAGNOSIS — Z85.850 HISTORY OF PAPILLARY ADENOCARCINOMA OF THYROID: ICD-10-CM

## 2022-06-02 DIAGNOSIS — E89.2 HYPOPARATHYROIDISM AFTER PROCEDURE: ICD-10-CM

## 2022-06-02 DIAGNOSIS — E89.0 POSTSURGICAL HYPOTHYROIDISM: Primary | ICD-10-CM

## 2022-06-02 DIAGNOSIS — E03.9 HYPOTHYROIDISM, UNSPECIFIED TYPE: ICD-10-CM

## 2022-06-02 PROBLEM — J01.90 SINUSITIS, ACUTE: Status: ACTIVE | Noted: 2021-03-30

## 2022-06-02 PROBLEM — F41.9 ANXIETY: Status: ACTIVE | Noted: 2020-03-04

## 2022-06-02 PROBLEM — E83.51 HYPOCALCEMIA: Status: ACTIVE | Noted: 2020-06-24

## 2022-06-02 PROBLEM — R82.90 ABNORMAL URINE: Status: ACTIVE | Noted: 2020-06-24

## 2022-06-02 PROBLEM — R53.83 FATIGUE: Status: ACTIVE | Noted: 2020-03-04

## 2022-06-02 PROBLEM — C73 PAPILLARY CARCINOMA OF THYROID: Status: ACTIVE | Noted: 2022-06-02

## 2022-06-02 PROBLEM — E04.2 MULTINODULAR GOITER: Status: ACTIVE | Noted: 2020-03-04

## 2022-06-02 LAB
ALBUMIN SERPL-MCNC: 4.2 GM/DL (ref 3.5–5)
BUN SERPL-MCNC: 7.4 MG/DL (ref 7–18.7)
CALCIUM SERPL-MCNC: 8 MG/DL (ref 8.4–10.2)
CHLORIDE SERPL-SCNC: 102 MMOL/L (ref 98–107)
CO2 SERPL-SCNC: 27 MMOL/L (ref 22–29)
CREAT SERPL-MCNC: 0.72 MG/DL (ref 0.55–1.02)
DEPRECATED CALCIDIOL+CALCIFEROL SERPL-MC: 33.1 NG/ML (ref 30–80)
GLUCOSE SERPL-MCNC: 84 MG/DL (ref 74–100)
PHOSPHATE SERPL-MCNC: 5.1 MG/DL (ref 2.3–4.7)
POTASSIUM SERPL-SCNC: 3.7 MMOL/L (ref 3.5–5.1)
SODIUM SERPL-SCNC: 140 MMOL/L (ref 136–145)
T4 FREE SERPL-MCNC: 0.54 NG/DL (ref 0.7–1.48)

## 2022-06-02 PROCEDURE — 3075F SYST BP GE 130 - 139MM HG: CPT | Mod: CPTII,,, | Performed by: NURSE PRACTITIONER

## 2022-06-02 PROCEDURE — 84439 ASSAY OF FREE THYROXINE: CPT | Performed by: NURSE PRACTITIONER

## 2022-06-02 PROCEDURE — 3079F DIAST BP 80-89 MM HG: CPT | Mod: CPTII,,, | Performed by: NURSE PRACTITIONER

## 2022-06-02 PROCEDURE — 36415 COLL VENOUS BLD VENIPUNCTURE: CPT | Performed by: NURSE PRACTITIONER

## 2022-06-02 PROCEDURE — 99214 OFFICE O/P EST MOD 30 MIN: CPT | Mod: PBBFAC | Performed by: NURSE PRACTITIONER

## 2022-06-02 PROCEDURE — 1159F MED LIST DOCD IN RCRD: CPT | Mod: CPTII,,, | Performed by: NURSE PRACTITIONER

## 2022-06-02 PROCEDURE — 3079F PR MOST RECENT DIASTOLIC BLOOD PRESSURE 80-89 MM HG: ICD-10-PCS | Mod: CPTII,,, | Performed by: NURSE PRACTITIONER

## 2022-06-02 PROCEDURE — 99214 PR OFFICE/OUTPT VISIT, EST, LEVL IV, 30-39 MIN: ICD-10-PCS | Mod: S$PBB,,, | Performed by: NURSE PRACTITIONER

## 2022-06-02 PROCEDURE — 80069 RENAL FUNCTION PANEL: CPT | Performed by: NURSE PRACTITIONER

## 2022-06-02 PROCEDURE — 3008F BODY MASS INDEX DOCD: CPT | Mod: CPTII,,, | Performed by: NURSE PRACTITIONER

## 2022-06-02 PROCEDURE — 3075F PR MOST RECENT SYSTOLIC BLOOD PRESS GE 130-139MM HG: ICD-10-PCS | Mod: CPTII,,, | Performed by: NURSE PRACTITIONER

## 2022-06-02 PROCEDURE — 82306 VITAMIN D 25 HYDROXY: CPT | Performed by: NURSE PRACTITIONER

## 2022-06-02 PROCEDURE — 99214 OFFICE O/P EST MOD 30 MIN: CPT | Mod: S$PBB,,, | Performed by: NURSE PRACTITIONER

## 2022-06-02 PROCEDURE — 1160F RVW MEDS BY RX/DR IN RCRD: CPT | Mod: CPTII,,, | Performed by: NURSE PRACTITIONER

## 2022-06-02 PROCEDURE — 1160F PR REVIEW ALL MEDS BY PRESCRIBER/CLIN PHARMACIST DOCUMENTED: ICD-10-PCS | Mod: CPTII,,, | Performed by: NURSE PRACTITIONER

## 2022-06-02 PROCEDURE — 1159F PR MEDICATION LIST DOCUMENTED IN MEDICAL RECORD: ICD-10-PCS | Mod: CPTII,,, | Performed by: NURSE PRACTITIONER

## 2022-06-02 PROCEDURE — 3008F PR BODY MASS INDEX (BMI) DOCUMENTED: ICD-10-PCS | Mod: CPTII,,, | Performed by: NURSE PRACTITIONER

## 2022-06-02 RX ORDER — SULFAMETHOXAZOLE AND TRIMETHOPRIM 800; 160 MG/1; MG/1
TABLET ORAL
COMMUNITY
Start: 2022-05-26 | End: 2022-06-02 | Stop reason: SDUPTHER

## 2022-06-02 RX ORDER — SULFAMETHOXAZOLE AND TRIMETHOPRIM 800; 160 MG/1; MG/1
60 TABLET ORAL
Qty: 30 TABLET | Refills: 2 | Status: SHIPPED | OUTPATIENT
Start: 2022-06-02 | End: 2022-06-03

## 2022-06-02 RX ORDER — THYROID 15 MG/1
15 TABLET ORAL
Qty: 30 TABLET | Refills: 1 | Status: SHIPPED | OUTPATIENT
Start: 2022-06-02 | End: 2022-06-03

## 2022-06-02 RX ORDER — SERTRALINE HCL 25 MG
25 TABLET ORAL DAILY
COMMUNITY
Start: 2022-05-26 | End: 2023-03-07

## 2022-06-02 NOTE — PROGRESS NOTES
Subjective:       Patient ID: Raza Cerda is a 31 y.o. female.    Chief Complaint: No chief complaint on file.    Previous Endocrine Clinic notes    10/08/2021 Telemedicine Visit Note-Endocrine Clinic: 32 y/o female scheduled today for endocrine clinic follow-up.  History of papillary thyroid carcinoma s/p complete thyroidectomy complicated by postsurgical hypoparathyroidism and hypothyroidism.  Patient is currently 36 weeks pregnant. Previous labs  Calcium level 7.4,  Albumin 2.5 corrected calcium 8.6 , Phos 4.0 on 08/12/2021 Hypothyroidism previous TSH 0.975 on 08/12/2021.  Patient had labs completed she says she reported to [facility] lab on Wednesday no labs noted in chart and orders canceled.  Lab was called states no specimen was received patient was instructed that we will need to repeat labs.  Patient reports some numbness when sitting on the toilet to her legs so she is unsure if it is due to low calcium levels are being 36 weeks pregnant.  She states that time when she is laying flat on her back she has numbness to her hands and legs.  Patient reports ability to sit but recently was able to have from the pharmacy and has been on generic levothyroxine.  She will be repeating her labs today.  Instructed patient we will ensure she is at goal for delivery.     01/27/2022 Endocrine Clinic Note: 31-year-old female scheduled today for endocrine clinic follow-up.  History of papillary thyroid carcinoma /p complete thyroidectomy complicated by postsurgical hypoparathyroidism and hypothyroidism.  Patient was previously pregnant and delivered on Nov 6th, 2021.  Papillary thyroid carcinoma/postsurgical hypothyroidism current TSH 0.018, free T4 1.63, T4 10.6.  Currently not at goal goal 0.1-0.5. Pt is currently on Tirosint 112 mcg  Postsurgical hypoparathyroidism patient is currently on calcitriol 0.25 mcg once a day and calcium citrate at night when symptomatic. She denies any symptoms of tinging or numbness.  She  states since delivery she has not had as many symptoms and has not had to take her nighttime calcium citrate often. Patient previously had thyroglobulin antibody which was  < 1.0 on 08/25/2020 and had increased to 2.3 on 10/26/2020 and was trending down on 04/29/2021 to 1.5.  Patient to repeat thyroglobulin levels today.    Current Endocrine Clinic Notes: 06/02/2022     31-year-old female scheduled today for endocrine clinic follow-up.  History of papillary thyroid carcinoma s/p complete thyroidectomy complicated by postsurgical hypoparathyroidism and hypothyroidism.  Patient reports today and was seen in the emergency room early alerts 3 days ago due to nausea and dizziness.  On labs patient had low sodium level, low potassium level, low chloride level and low calcium level.  Patient denies any vomiting or diarrhea at that time and states she is feeling better today.  Hypocalcemia patient has only been on calcitriol states that prior she was not symptomatic and not needing her carbonate at night.  She states today she was feeling bad she was having some muscle cramping which is improving.  Postsurgical hypothyroidism patient was recently changed to [place] Thyroid converted from levothyroxine 112 mcg.  She is currently on [place] Thyroid 60mg  and TSH that was drawn the emergency room and was elevated 9.319.  Instructed patient will slowly increase her [place] Thyroid 15 mg to 75 mg and recheck her labs in 3-4 weeks if needed will increase to 90mg at that time.     Comprehensive metabolic panel  Order: [id number]   Ref Range & Units 3 d ago  Creatinine Level 0.57 - 1.25 mg/dL 0.70   Blood Urea Nitrogen Level 5 - 25 mg/dL 9   Sodium Level 136 - 145 mmol/L 134 Low    Potassium Level 3.5 - 5.1 mmol/L 3.3 Low    Chloride Level 100 - 109 mmol/L 96 Low    CO2 Level 22 - 33 mmol/L 23   Glucose Level 70 - 100 mg/dL 111 High    Calcium Level 8.8 - 10.6 mg/dL 7.3 Low          Review of Systems   Constitutional: Negative for  activity change, appetite change and fatigue.   HENT: Negative for dental problem, hearing loss, tinnitus, trouble swallowing and goiter.    Eyes: Negative for photophobia, pain and visual disturbance.   Respiratory: Negative for cough, chest tightness and wheezing.    Cardiovascular: Negative for chest pain, palpitations and leg swelling.   Gastrointestinal: Negative for abdominal pain, constipation, diarrhea, nausea and reflux.   Endocrine: Negative for cold intolerance, heat intolerance, polydipsia and polyphagia.   Genitourinary: Negative for difficulty urinating, flank pain, hematuria, hot flashes, menstrual irregularity, menstrual problem, nocturia and urgency.   Musculoskeletal: Negative for back pain, gait problem, joint swelling, leg pain and joint deformity.   Integumentary:  Negative for color change, pallor, rash and breast discharge.   Allergic/Immunologic: Negative for environmental allergies, food allergies and immunocompromised state.   Neurological: Negative for tremors, seizures, headaches, disturbances in coordination, memory loss and coordination difficulties.   Psychiatric/Behavioral: Negative for agitation, behavioral problems and sleep disturbance. The patient is not nervous/anxious.          Objective:      Physical Exam  Constitutional:       General: She is not in acute distress.     Appearance: Normal appearance. She is not ill-appearing.   HENT:      Head: Normocephalic and atraumatic.      Right Ear: External ear normal.      Left Ear: External ear normal.      Nose: Nose normal. No congestion or rhinorrhea.      Mouth/Throat:      Mouth: Mucous membranes are moist.      Pharynx: Oropharynx is clear. No oropharyngeal exudate.   Eyes:      General:         Right eye: No discharge.         Left eye: No discharge.      Conjunctiva/sclera: Conjunctivae normal.      Pupils: Pupils are equal, round, and reactive to light.   Neck:      Thyroid: No thyroid mass, thyromegaly or thyroid  tenderness.   Cardiovascular:      Rate and Rhythm: Normal rate and regular rhythm.      Pulses: Normal pulses.      Heart sounds: Normal heart sounds. No murmur heard.  Pulmonary:      Effort: Pulmonary effort is normal. No respiratory distress.      Breath sounds: Normal breath sounds.   Abdominal:      General: Abdomen is flat. Bowel sounds are normal. There is no distension.      Palpations: Abdomen is soft.      Tenderness: There is no abdominal tenderness.   Musculoskeletal:         General: No swelling or tenderness. Normal range of motion.      Cervical back: Normal range of motion and neck supple. No tenderness.      Right lower leg: No edema.      Left lower leg: No edema.   Feet:      Right foot:      Skin integrity: Skin integrity normal.      Left foot:      Skin integrity: Skin integrity normal.   Lymphadenopathy:      Cervical: No cervical adenopathy.   Skin:     General: Skin is warm and dry.      Coloration: Skin is not jaundiced or pale.   Neurological:      General: No focal deficit present.      Mental Status: She is alert and oriented to person, place, and time. Mental status is at baseline.      Coordination: Coordination normal.      Gait: Gait normal.   Psychiatric:         Mood and Affect: Mood normal.         Behavior: Behavior normal.         Thought Content: Thought content normal.         Assessment:       Problem List Items Addressed This Visit    None     Visit Diagnoses     Postsurgical hypothyroidism    -  Primary    Hypoparathyroidism after procedure        History of papillary adenocarcinoma of thyroid              Plan:       Postsurgical hypothyroidism  See Below    Ref Range & Units 3 d ago   TSH Ultrasensitive 0.350 - 4.940 uIU/ml 9.318 High         Hypoparathyroidism after procedure  Occasional symptoms of numbness  Continue current calcitriol 0.25 once a day and calcium citrate once at night (citrate only when patient has symptoms)  Renal panel   -     Renal Function Panel;  Future; Expected date: 06/03/2022    History of papillary adenocarcinoma of thyroid  TSH 0.018, T4 10.6. Free T4 1.63 on 03/31/2022  TSH 9.318 Free T4 0.63 (3 days prior)   On [place] 60 mg q day increase to 75 mcg   Repeat TSH, Free T4 thyroglobulin AB levels with repeat labs 3-4 weeks    Repeat renal panel and Vit D today   Return to clinic 3 months    -     TSH; Future; Expected date: 06/03/2022  -     T4, Free; Future; Expected date: 06/03/2022  -     T3, Free ([facility]); Future; Expected date: 06/03/2022  -     Miscellaneous Test, Sendout [facility]GR Thyroglobulin Tumor marker reflex; Future; Expected date: 06/03/2022      ARMOUR THYROID 60 mg Tab; Take 1 tablet (60 mg total) by mouth before breakfast. Take 60 mg with 15 mg to total 75mg  Dispense: 30 tablet; Refill: 2  -     thyroid, pork, (ARMOUR THYROID) 15 mg Tab; Take 1 tablet (15 mg total) by mouth before breakfast. Take 60 mg with 15 mg to total 75mg  Dispense: 30 tablet; Refill: 1          Comprehensive metabolic panel  Order: [id number]   Ref Range & Units 3 d ago   Creatinine Level 0.57 - 1.25 mg/dL 0.70    Blood Urea Nitrogen Level 5 - 25 mg/dL 9    Sodium Level 136 - 145 mmol/L 134 Low     Potassium Level 3.5 - 5.1 mmol/L 3.3 Low     Chloride Level 100 - 109 mmol/L 96 Low     CO2 Level 22 - 33 mmol/L 23    Glucose Level 70 - 100 mg/dL 111 High     Calcium Level 8.8 - 10.6 mg/dL 7.3 Low

## 2022-06-03 DIAGNOSIS — E89.0 POSTSURGICAL HYPOTHYROIDISM: ICD-10-CM

## 2022-06-03 DIAGNOSIS — C73 THYROID CANCER: ICD-10-CM

## 2022-06-03 DIAGNOSIS — E20.9 HYPOPARATHYROIDISM, UNSPECIFIED HYPOPARATHYROIDISM TYPE: Primary | ICD-10-CM

## 2022-06-03 RX ORDER — THYROID 90 MG/1
90 TABLET ORAL
Qty: 30 TABLET | Refills: 5 | Status: SHIPPED | OUTPATIENT
Start: 2022-06-03 | End: 2022-12-22 | Stop reason: SDUPTHER

## 2022-06-03 RX ORDER — CALCITRIOL 0.25 UG/1
0.25 CAPSULE ORAL DAILY
Qty: 30 CAPSULE | Refills: 6 | Status: SHIPPED | OUTPATIENT
Start: 2022-06-03 | End: 2022-08-31 | Stop reason: SDUPTHER

## 2022-07-08 ENCOUNTER — LAB VISIT (OUTPATIENT)
Dept: LAB | Facility: HOSPITAL | Age: 32
End: 2022-07-08
Attending: NURSE PRACTITIONER
Payer: MEDICAID

## 2022-07-08 DIAGNOSIS — C73 THYROID CANCER: ICD-10-CM

## 2022-07-08 DIAGNOSIS — E89.0 POSTSURGICAL HYPOTHYROIDISM: ICD-10-CM

## 2022-07-08 LAB
T3FREE SERPL-MCNC: 4.54 PG/ML (ref 1.57–3.91)
T4 FREE SERPL-MCNC: 0.75 NG/DL (ref 0.7–1.48)
TSH SERPL-ACNC: 0.27 UIU/ML (ref 0.35–4.94)

## 2022-07-08 PROCEDURE — 36415 COLL VENOUS BLD VENIPUNCTURE: CPT

## 2022-07-08 PROCEDURE — 30000890 MAYO GENERIC ORDERABLE

## 2022-07-08 PROCEDURE — 86800 THYROGLOBULIN ANTIBODY: CPT

## 2022-07-08 PROCEDURE — 84432 ASSAY OF THYROGLOBULIN: CPT | Performed by: NURSE PRACTITIONER

## 2022-07-08 PROCEDURE — 84443 ASSAY THYROID STIM HORMONE: CPT

## 2022-07-08 PROCEDURE — 84481 FREE ASSAY (FT-3): CPT

## 2022-07-08 PROCEDURE — 30000890 HC MISC. SEND OUT TEST: Performed by: NURSE PRACTITIONER

## 2022-07-08 PROCEDURE — 84439 ASSAY OF FREE THYROXINE: CPT

## 2022-07-13 ENCOUNTER — TELEPHONE (OUTPATIENT)
Dept: ENDOCRINOLOGY | Facility: CLINIC | Age: 32
End: 2022-07-13
Payer: MEDICAID

## 2022-07-13 NOTE — TELEPHONE ENCOUNTER
Called patient back, results given. Pt is asking for clarifying, does she still need to take the 90 of [place]?

## 2022-07-13 NOTE — TELEPHONE ENCOUNTER
----- Message from Kaylynn Patten NP sent at 7/12/2022  5:44 PM CDT -----  Please instruct the patient that her Thyroid cancer are not detectable.

## 2022-08-22 ENCOUNTER — DOCUMENTATION ONLY (OUTPATIENT)
Dept: ADMINISTRATIVE | Facility: HOSPITAL | Age: 32
End: 2022-08-22
Payer: MEDICAID

## 2022-08-31 ENCOUNTER — OFFICE VISIT (OUTPATIENT)
Dept: ENDOCRINOLOGY | Facility: CLINIC | Age: 32
End: 2022-08-31
Payer: MEDICAID

## 2022-08-31 VITALS
BODY MASS INDEX: 23.38 KG/M2 | RESPIRATION RATE: 20 BRPM | HEART RATE: 93 BPM | HEIGHT: 69 IN | DIASTOLIC BLOOD PRESSURE: 79 MMHG | SYSTOLIC BLOOD PRESSURE: 110 MMHG | WEIGHT: 157.88 LBS | TEMPERATURE: 98 F

## 2022-08-31 DIAGNOSIS — C73 PAPILLARY THYROID CARCINOMA: Primary | ICD-10-CM

## 2022-08-31 DIAGNOSIS — E89.0 POSTSURGICAL HYPOTHYROIDISM: ICD-10-CM

## 2022-08-31 DIAGNOSIS — E20.9 HYPOPARATHYROIDISM, UNSPECIFIED HYPOPARATHYROIDISM TYPE: ICD-10-CM

## 2022-08-31 DIAGNOSIS — E89.2 POSTSURGICAL HYPOPARATHYROIDISM: ICD-10-CM

## 2022-08-31 LAB
ALBUMIN SERPL-MCNC: 4.1 GM/DL (ref 3.5–5)
BUN SERPL-MCNC: 7.6 MG/DL (ref 7–18.7)
CALCIUM SERPL-MCNC: 8.4 MG/DL (ref 8.4–10.2)
CHLORIDE SERPL-SCNC: 101 MMOL/L (ref 98–107)
CO2 SERPL-SCNC: 30 MMOL/L (ref 22–29)
CREAT SERPL-MCNC: 0.79 MG/DL (ref 0.55–1.02)
GFR SERPLBLD CREATININE-BSD FMLA CKD-EPI: >60 MLS/MIN/1.73/M2
GLUCOSE SERPL-MCNC: 91 MG/DL (ref 74–100)
MAYO GENERIC ORDERABLE RESULT: NORMAL
PHOSPHATE SERPL-MCNC: 4.8 MG/DL (ref 2.3–4.7)
POTASSIUM SERPL-SCNC: 3.7 MMOL/L (ref 3.5–5.1)
SODIUM SERPL-SCNC: 141 MMOL/L (ref 136–145)
T3FREE SERPL-MCNC: 3.63 PG/ML (ref 1.57–3.91)
T4 FREE SERPL-MCNC: 0.75 NG/DL (ref 0.7–1.48)
TSH SERPL-ACNC: 0.24 UIU/ML (ref 0.35–4.94)

## 2022-08-31 PROCEDURE — 3078F PR MOST RECENT DIASTOLIC BLOOD PRESSURE < 80 MM HG: ICD-10-PCS | Mod: CPTII,,, | Performed by: NURSE PRACTITIONER

## 2022-08-31 PROCEDURE — 99214 PR OFFICE/OUTPT VISIT, EST, LEVL IV, 30-39 MIN: ICD-10-PCS | Mod: S$PBB,,, | Performed by: NURSE PRACTITIONER

## 2022-08-31 PROCEDURE — 36415 COLL VENOUS BLD VENIPUNCTURE: CPT | Performed by: NURSE PRACTITIONER

## 2022-08-31 PROCEDURE — 3074F PR MOST RECENT SYSTOLIC BLOOD PRESSURE < 130 MM HG: ICD-10-PCS | Mod: CPTII,,, | Performed by: NURSE PRACTITIONER

## 2022-08-31 PROCEDURE — 84443 ASSAY THYROID STIM HORMONE: CPT | Performed by: NURSE PRACTITIONER

## 2022-08-31 PROCEDURE — 3078F DIAST BP <80 MM HG: CPT | Mod: CPTII,,, | Performed by: NURSE PRACTITIONER

## 2022-08-31 PROCEDURE — 84439 ASSAY OF FREE THYROXINE: CPT | Performed by: NURSE PRACTITIONER

## 2022-08-31 PROCEDURE — 84481 FREE ASSAY (FT-3): CPT | Performed by: NURSE PRACTITIONER

## 2022-08-31 PROCEDURE — 3008F BODY MASS INDEX DOCD: CPT | Mod: CPTII,,, | Performed by: NURSE PRACTITIONER

## 2022-08-31 PROCEDURE — 99214 OFFICE O/P EST MOD 30 MIN: CPT | Mod: PBBFAC | Performed by: NURSE PRACTITIONER

## 2022-08-31 PROCEDURE — 3074F SYST BP LT 130 MM HG: CPT | Mod: CPTII,,, | Performed by: NURSE PRACTITIONER

## 2022-08-31 PROCEDURE — 3008F PR BODY MASS INDEX (BMI) DOCUMENTED: ICD-10-PCS | Mod: CPTII,,, | Performed by: NURSE PRACTITIONER

## 2022-08-31 PROCEDURE — 1159F MED LIST DOCD IN RCRD: CPT | Mod: CPTII,,, | Performed by: NURSE PRACTITIONER

## 2022-08-31 PROCEDURE — 1159F PR MEDICATION LIST DOCUMENTED IN MEDICAL RECORD: ICD-10-PCS | Mod: CPTII,,, | Performed by: NURSE PRACTITIONER

## 2022-08-31 PROCEDURE — 99214 OFFICE O/P EST MOD 30 MIN: CPT | Mod: S$PBB,,, | Performed by: NURSE PRACTITIONER

## 2022-08-31 PROCEDURE — 80069 RENAL FUNCTION PANEL: CPT | Performed by: NURSE PRACTITIONER

## 2022-08-31 RX ORDER — CALCITRIOL 0.25 UG/1
0.25 CAPSULE ORAL DAILY
Qty: 30 CAPSULE | Refills: 11 | Status: SHIPPED | OUTPATIENT
Start: 2022-08-31 | End: 2023-09-21

## 2022-08-31 RX ORDER — BUPROPION HYDROCHLORIDE 150 MG/1
150 TABLET ORAL DAILY
COMMUNITY
Start: 2022-08-02

## 2022-08-31 NOTE — PROGRESS NOTES
Subjective:       Patient ID: Raza Cerda is a 32 y.o. female.    Chief Complaint: post surgical hypothyroidism follow up    Previous endocrine clinic notes:      10/30/2021 Endocrine Clinic Note: Ms. Cerda is a 31 y/o female with a PMH of stage I thyroid cancer s/p thyroidectomy 5/20 w/ comorbid postsurgical hypothyroidism and hypoparathyroidism. Patient has history of hypoparathyroidism post surgery. Today, patient states she is doing well, her only complaints are mild cold intolerance and some tingling of her hands and feet every 1-2 days, in the past she has complained of palpitations and tremors, however these are now resolved. She does not take extra calcium for symptoms, and states she experiences the tremors mainly before she takes her morning Calcitriol. She remains on Tirosint and is complaint with her medication. She is complaint with calcitriol and calcium carbonate. Her TSH remains suppressed post surgically and is in the acceptable range for this stage of post-op treatment. Thyroglobulin and thyroglobulin antibody were negative last visit in August. Repeat thyroglobulin and thyroglobulin antibody pending today. Patient has no other concerns or complaints today. Patient is to return to clinic in 6 months for continued monitoring. She will have labs and urine studies completed before this visit.  [1]      04/01/2021 Endocrine Clinic Note: 31 y/o female scheduled today for Endocrine F/U. with a PMH of stage I thyroid cancer s/p thyroidectomy 5/20 w/ comorbid postsurgical hypothyroidism and hypoparathyroidism.  Patient reports to clinic today recently finding out she is 5 weeks pregnant.  Patient states her hCG levels were drawn and matched her being 4 to 5 weeks and she states her periods have been regular and pregnancy dates are accurate. Postsurgical hypothyroidism TSH was 1.2750, free T4 0.87, T4 9.52, free T3 2.57 patient reports fatigue.  She denies any missed medication doses and states  she takes her medication daily on an empty stomach 30 minutes before any food or beverages with water only.  Hypoparathyroidism patient's current calcium level is 9.7, albumin 3.5 corrected calcium 10.1 phosphorus 3.9 patient is currently on calcitriol 25 mcg 1 tablet twice daily and also calcium carbonate 500 mg 1 tablet daily. She denies any tingling to hands or feet, muscle weakness or spasms.      Thyroid cancer US 03/30/2021 FINDINGS:  The thyroid gland has been removed. No abnormal soft tissue is seen in the bed of the resected thyroid gland. No abnormal lymph nodes are seen within the visualized portions of the neck. No other significant findings. IMPRESSION: 1. No significant abnormalities are identified. [1]      07/16/2021 Endocrine Clinic note: Mrs. Cerda is a 31 y.o. F with PMH Papillary thyroid carcinoma s/p Complete Thyroidectomy complicated by Post-surgical Hypothyroidism and Post-surgical Hypoparathyroidism, Pregnancy at 23 WGA, and Anxiety returning to the Endocrinology Clinic for Follow-up. She denies any headache, muscle weakness, muscle spasms, nausea, emesis, constipation, diarrhea, CP, dyspnea, cold intolerance, heat intolerance, nor headaches; + for fatigue attributable to current pregnancy. She also complains of intermittent parasthesia of R hand which is relieved by PRN calcium carbonate, currently not taking scheduling calcium carbonate.  She states she is feeling well today aside from being tired which she attributes to pregnancy. [2]      08/16/2021 Endocrine Clinic Note: 31 year old female scheduled today as Endocrine Clinic F/U. HX of Papillary thyroid carcinoma s/p Complete Thyroidectomy complicated by Post-surgical Hypothyroidism and Post-surgical Hypoparathyroidism, Pregnancy at 28 week today.  Current Calcium level 7.4,  Albumin 2.5 corrected calcium 8.6 , Phos 4.0. Pt reports occasional right hand numbness at night but only takes her calcium Citrate as needed when she had  symptoms with numbness. She reports fatigue currently but attributes it to pregnancy. She is currently on calcitriol 0.25 mg Once a day and calcium citrate once at night (only takes with numbness). Low albumin pt states her GYN is monitoring. Previous Vitamin D level 57.3 on 04/01/2021. [1]      10/08/2021 Telemedicine Visit Note-Endocrine Clinic: 32 y/o female scheduled today for endocrine clinic follow-up.  History of papillary thyroid carcinoma s/p complete thyroidectomy complicated by postsurgical hypoparathyroidism and hypothyroidism.  Patient is currently 36 weeks pregnant. Previous labs  Calcium level 7.4,  Albumin 2.5 corrected calcium 8.6 , Phos 4.0 on 08/12/2021 Hypothyroidism previous TSH 0.975 on 08/12/2021.  Patient had labs completed she says she reported to [facility] lab on Wednesday no labs noted in chart and orders canceled.  Lab was called states no specimen was received patient was instructed that we will need to repeat labs.  Patient reports some numbness when sitting on the toilet to her legs so she is unsure if it is due to low calcium levels are being 36 weeks pregnant.  She states that time when she is laying flat on her back she has numbness to her hands and legs.  Patient reports ability to sit but recently was able to have from the pharmacy and has been on generic levothyroxine.  She will be repeating her labs today.  Instructed patient we will ensure she is at goal for delivery.     01/27/2022 Endocrine Clinic Note: 31-year-old female scheduled today for endocrine clinic follow-up.  History of papillary thyroid carcinoma /p complete thyroidectomy complicated by postsurgical hypoparathyroidism and hypothyroidism.  Patient was previously pregnant and delivered on Nov 6th, 2021.  Papillary thyroid carcinoma/postsurgical hypothyroidism current TSH 0.018, free T4 1.63, T4 10.6.  Currently not at goal goal 0.1-0.5. Pt is currently on Tirosint 112 mcg  Postsurgical hypoparathyroidism patient is  currently on calcitriol 0.25 mcg once a day and calcium citrate at night when symptomatic. She denies any symptoms of tinging or numbness.  She states since delivery she has not had as many symptoms and has not had to take her nighttime calcium citrate often. Patient previously had thyroglobulin antibody which was  < 1.0 on 08/25/2020 and had increased to 2.3 on 10/26/2020 and was trending down on 04/29/2021 to 1.5.  Patient to repeat thyroglobulin levels today.    Current Endocrine Clinic Note 08/31/2022: 32-year-old female scheduled today her endocrine clinic follow-up.  History of papillary thyroid carcinoma with complete thyroidectomy postsurgical hypothyroidism and hypoparathyroidism.  Patient was previously on tIrosent requested change to [place] thyroid due to her fatigue symptoms.  Patient is currently on [place] 90 mg recent TSH  0.2709 on 07/08/2022 at goal (goal 0.1-0.5). Thyroglobulin antibody <1.8 on 07/08/2022. Thyroglobulin tumor marker CA, S 0.9.  Patient had ultrasound on 03/30/2021 with no significant abnormalities noted in the thyroid bed.  Postsurgical hypoparathyroidism recent calcium level Calcium level 8.0 on 06/02/2022 patient is on calcitriol 0.25 mcg once a day and Patient uses Tums p.r.n. patient denies any recent tingling pains or muscle cramps.    Details    Reading Physician Reading Date Result Priority  IN REPORT, PROVIDER 3/30/2021     Narrative & Impression  TECHNIQUE: Gray-scale and color Doppler images of the thyroid.     HISTORY: Thyroid Carcinoma     COMPARISON: Neck ultrasound 04/16/2020      FINDINGS:      The thyroid gland has been removed. No abnormal soft tissue is seen in  the bed of the resected thyroid gland.     No abnormal lymph nodes are seen within the visualized portions of the  neck. No other significant findings.      IMPRESSION:     1. No significant abnormalities are identified.      Electronically Signed By: Kandace Triana MD  Date/Time Signed: 03/30/2021  13:46    Specimen Collected: 03/30/21 10:56 Last Resulted: 03/30/21 13:46        Review of Systems   Constitutional:  Negative for activity change, appetite change and fatigue.   HENT:  Negative for dental problem, hearing loss, tinnitus, trouble swallowing and goiter.    Eyes:  Negative for photophobia, pain and visual disturbance.   Respiratory:  Negative for cough, chest tightness and wheezing.    Cardiovascular:  Negative for chest pain, palpitations and leg swelling.   Gastrointestinal:  Negative for abdominal pain, constipation, diarrhea, nausea and reflux.   Endocrine: Negative for cold intolerance, heat intolerance, polydipsia and polyphagia.   Genitourinary:  Negative for difficulty urinating, flank pain, hematuria, hot flashes, menstrual irregularity, menstrual problem, nocturia and urgency.   Musculoskeletal:  Negative for back pain, gait problem, joint swelling, leg pain and joint deformity.   Integumentary:  Negative for color change, pallor, rash and breast discharge.   Allergic/Immunologic: Negative for environmental allergies, food allergies and immunocompromised state.   Neurological:  Negative for tremors, seizures, headaches, coordination difficulties, memory loss and coordination difficulties.   Psychiatric/Behavioral:  Negative for agitation, behavioral problems and sleep disturbance. The patient is not nervous/anxious.        Objective:      Physical Exam  Constitutional:       General: She is not in acute distress.     Appearance: Normal appearance. She is not ill-appearing.   HENT:      Head: Normocephalic and atraumatic.      Right Ear: External ear normal.      Left Ear: External ear normal.      Nose: Nose normal. No congestion or rhinorrhea.      Mouth/Throat:      Mouth: Mucous membranes are moist.      Pharynx: Oropharynx is clear. No oropharyngeal exudate.   Eyes:      General:         Right eye: No discharge.         Left eye: No discharge.      Conjunctiva/sclera: Conjunctivae  normal.      Pupils: Pupils are equal, round, and reactive to light.   Neck:      Thyroid: No thyroid mass, thyromegaly or thyroid tenderness.   Cardiovascular:      Rate and Rhythm: Normal rate and regular rhythm.      Pulses: Normal pulses.      Heart sounds: Normal heart sounds. No murmur heard.  Pulmonary:      Effort: Pulmonary effort is normal. No respiratory distress.      Breath sounds: Normal breath sounds.   Abdominal:      General: Abdomen is flat. Bowel sounds are normal. There is no distension.      Palpations: Abdomen is soft.      Tenderness: There is no abdominal tenderness.   Musculoskeletal:         General: No swelling or tenderness. Normal range of motion.      Cervical back: Normal range of motion and neck supple. No tenderness.      Right lower leg: No edema.      Left lower leg: No edema.   Feet:      Right foot:      Skin integrity: Skin integrity normal.      Left foot:      Skin integrity: Skin integrity normal.   Lymphadenopathy:      Cervical: No cervical adenopathy.   Skin:     General: Skin is warm and dry.      Coloration: Skin is not jaundiced or pale.   Neurological:      General: No focal deficit present.      Mental Status: She is alert and oriented to person, place, and time. Mental status is at baseline.      Coordination: Coordination normal.      Gait: Gait normal.   Psychiatric:         Mood and Affect: Mood normal.         Behavior: Behavior normal.         Thought Content: Thought content normal.       Assessment:       Problem List Items Addressed This Visit    None  Visit Diagnoses       Papillary thyroid carcinoma    -  Primary    Postsurgical hypothyroidism        Relevant Orders    T4, Free (Completed)    T3, Free ([facility]) (Completed)    TSH (Completed)    Postsurgical hypoparathyroidism        Relevant Orders    Renal Function Panel (Completed)              Plan:       Papillary thyroid carcinoma  Thyroglobulin antibody <1.8 on 07/08/2022  Thyroglobulin tumor marker CA, S  0.9    Postsurgical hypothyroidism  TSH 0.2709 on 07/08/2022 at goal (goal 0.1-0.5)   Currently on [place] 90 mg recently changed from Tirosent   Repeat TSH free T4 today  Return to clinic in 4 months  -     T4, Free; Future; Expected date: 08/31/2022  -     T3, Free ([facility]); Future; Expected date: 08/31/2022  -     TSH; Future; Expected date: 08/31/2022  Component Ref Range & Units 1 mo ago   (7/8/22) 5 mo ago   (3/31/22) 1 yr ago   (8/12/21) 1 yr ago   (5/25/21) 1 yr ago   (4/29/21) 1 yr ago   (4/1/21) 1 yr ago   (10/26/20)   Thyroid Stimulating Hormone 0.3500 - 4.9400 uIU/mL 0.2709 Low   0.0761 R  0.9752  1.2182  0.8451  1.2750  0.1567 Low     Resulting Agency  [facility]      Component Ref Range & Units 1 mo ago   (7/8/22) 3 mo ago   (6/2/22) 5 mo ago   (3/31/22) 1 yr ago   (4/29/21) 1 yr ago   (4/1/21) 1 yr ago   (10/26/20) 2 yr ago   (8/25/20)   Thyroxine Free 0.70 - 1.48 ng/dL 0.75  0.54 Low   1.08 R  0.85  0.87  1.15  1.22 R      Postsurgical hypoparathyroidism  Renal panel today  Calcium level 8.0 on 06/02/2022  Continue calcitriol 0.25 mcg once a day  Patient uses Tums p.r.n.  -     Renal Function Panel; Future; Expected date: 08/31/2022      I spent a total of 30 minutes on the day of the visit.  This includes face to face time and non-face to face time preparing to see the patient (eg, review of tests), obtaining and/or reviewing separately obtained history, documenting clinical information in the electronic or other health record, independently interpreting results and communicating results to the patient/family/caregiver, or care coordinator.

## 2022-08-31 NOTE — PROGRESS NOTES
Subjective:       Patient ID: Raza Cerda is a 32 y.o. female.    Chief Complaint: No chief complaint on file.    Patient is [id number]/30/2021 Endocrine Clinic Note: Ms. Cerda is a 29 y/o female with a PMH of stage I thyroid cancer s/p thyroidectomy 5/20 w/ comorbid postsurgical hypothyroidism and hypoparathyroidism. Patient has history of hypoparathyroidism post surgery. Today, patient states she is doing well, her only complaints are mild cold intolerance and some tingling of her hands and feet every 1-2 days, in the past she has complained of palpitations and tremors, however these are now resolved. She does not take extra calcium for symptoms, and states she experiences the tremors mainly before she takes her morning Calcitriol. She remains on Tirosint and is complaint with her medication. She is complaint with calcitriol and calcium carbonate. Her TSH remains suppressed post surgically and is in the acceptable range for this stage of post-op treatment. Thyroglobulin and thyroglobulin antibody were negative last visit in August. Repeat thyroglobulin and thyroglobulin antibody pending today. Patient has no other concerns or complaints today. Patient is to return to clinic in 6 months for continued monitoring. She will have labs and urine studies completed before this visit.  [1]      04/01/2021 Endocrine Clinic Note: 29 y/o female scheduled today for Endocrine F/U. with a PMH of stage I thyroid cancer s/p thyroidectomy 5/20 w/ comorbid postsurgical hypothyroidism and hypoparathyroidism.  Patient reports to clinic today recently finding out she is 5 weeks pregnant.  Patient states her hCG levels were drawn and matched her being 4 to 5 weeks and she states her periods have been regular and pregnancy dates are accurate. Postsurgical hypothyroidism TSH was 1.2750, free T4 0.87, T4 9.52, free T3 2.57 patient reports fatigue.  She denies any missed medication doses and states she takes her medication daily  on an empty stomach 30 minutes before any food or beverages with water only.  Hypoparathyroidism patient's current calcium level is 9.7, albumin 3.5 corrected calcium 10.1 phosphorus 3.9 patient is currently on calcitriol 25 mcg 1 tablet twice daily and also calcium carbonate 500 mg 1 tablet daily. She denies any tingling to hands or feet, muscle weakness or spasms.      Thyroid cancer US 03/30/2021 FINDINGS:  The thyroid gland has been removed. No abnormal soft tissue is seen in the bed of the resected thyroid gland. No abnormal lymph nodes are seen within the visualized portions of the neck. No other significant findings. IMPRESSION: 1. No significant abnormalities are identified. [1]      07/16/2021 Endocrine Clinic note: Mrs. Cerda is a 31 y.o. F with PMH Papillary thyroid carcinoma s/p Complete Thyroidectomy complicated by Post-surgical Hypothyroidism and Post-surgical Hypoparathyroidism, Pregnancy at 23 WGA, and Anxiety returning to the Endocrinology Clinic for Follow-up. She denies any headache, muscle weakness, muscle spasms, nausea, emesis, constipation, diarrhea, CP, dyspnea, cold intolerance, heat intolerance, nor headaches; + for fatigue attributable to current pregnancy. She also complains of intermittent parasthesia of R hand which is relieved by PRN calcium carbonate, currently not taking scheduling calcium carbonate.  She states she is feeling well today aside from being tired which she attributes to pregnancy. [2]      08/16/2021 Endocrine Clinic Note: 31 year old female scheduled today as Endocrine Clinic F/U. HX of Papillary thyroid carcinoma s/p Complete Thyroidectomy complicated by Post-surgical Hypothyroidism and Post-surgical Hypoparathyroidism, Pregnancy at 28 week today.  Current Calcium level 7.4,  Albumin 2.5 corrected calcium 8.6 , Phos 4.0. Pt reports occasional right hand numbness at night but only takes her calcium Citrate as needed when she had symptoms with numbness. She reports  fatigue currently but attributes it to pregnancy. She is currently on calcitriol 0.25 mg Once a day and calcium citrate once at night (only takes with numbness). Low albumin pt states her GYN is monitoring. Previous Vitamin D level 57.3 on 04/01/2021. [1]      10/08/2021 Telemedicine Visit Note-Endocrine Clinic: 30 y/o female scheduled today for endocrine clinic follow-up.  History of papillary thyroid carcinoma s/p complete thyroidectomy complicated by postsurgical hypoparathyroidism and hypothyroidism.  Patient is currently 36 weeks pregnant. Previous labs  Calcium level 7.4,  Albumin 2.5 corrected calcium 8.6 , Phos 4.0 on 08/12/2021 Hypothyroidism previous TSH 0.975 on 08/12/2021.  Patient had labs completed she says she reported to [facility] lab on Wednesday no labs noted in chart and orders canceled.  Lab was called states no specimen was received patient was instructed that we will need to repeat labs.  Patient reports some numbness when sitting on the toilet to her legs so she is unsure if it is due to low calcium levels are being 36 weeks pregnant.  She states that time when she is laying flat on her back she has numbness to her hands and legs.  Patient reports ability to sit but recently was able to have from the pharmacy and has been on generic levothyroxine.  She will be repeating her labs today.  Instructed patient we will ensure she is at goal for delivery.     01/27/2022 Endocrine Clinic Note: 31-year-old female scheduled today for endocrine clinic follow-up.  History of papillary thyroid carcinoma /p complete thyroidectomy complicated by postsurgical hypoparathyroidism and hypothyroidism.  Patient was previously pregnant and delivered on Nov 6th, 2021.  Papillary thyroid carcinoma/postsurgical hypothyroidism current TSH 0.018, free T4 1.63, T4 10.6.  Currently not at goal goal 0.1-0.5. Pt is currently on Tirosint 112 mcg  Postsurgical hypoparathyroidism patient is currently on calcitriol 0.25 mcg once a  day and calcium citrate at night when symptomatic. She denies any symptoms of tinging or numbness.  She states since delivery she has not had as many symptoms and has not had to take her nighttime calcium citrate often. Patient previously had thyroglobulin antibody which was  < 1.0 on 08/25/2020 and had increased to 2.3 on 10/26/2020 and was trending down on 04/29/2021 to 1.5.  Patient to repeat thyroglobulin levels today.    Review of Systems   Constitutional:  Negative for activity change, appetite change and fatigue.   HENT:  Negative for dental problem, hearing loss, tinnitus, trouble swallowing and goiter.    Eyes:  Negative for photophobia, pain and visual disturbance.   Respiratory:  Negative for cough, chest tightness and wheezing.    Cardiovascular:  Negative for chest pain, palpitations and leg swelling.   Gastrointestinal:  Negative for abdominal pain, constipation, diarrhea, nausea and reflux.   Endocrine: Negative for cold intolerance, heat intolerance, polydipsia and polyphagia.   Genitourinary:  Negative for difficulty urinating, flank pain, hematuria, hot flashes, menstrual irregularity, menstrual problem, nocturia and urgency.   Musculoskeletal:  Negative for back pain, gait problem, joint swelling, leg pain and joint deformity.   Integumentary:  Negative for color change, pallor, rash and breast discharge.   Allergic/Immunologic: Negative for environmental allergies, food allergies and immunocompromised state.   Neurological:  Negative for tremors, seizures, headaches, coordination difficulties, memory loss and coordination difficulties.   Psychiatric/Behavioral:  Negative for agitation, behavioral problems and sleep disturbance. The patient is not nervous/anxious.        Objective:      Physical Exam  Constitutional:       General: She is not in acute distress.     Appearance: Normal appearance. She is not ill-appearing.   HENT:      Head: Normocephalic and atraumatic.      Right Ear: External ear  normal.      Left Ear: External ear normal.      Nose: Nose normal. No congestion or rhinorrhea.      Mouth/Throat:      Mouth: Mucous membranes are moist.      Pharynx: Oropharynx is clear. No oropharyngeal exudate.   Eyes:      General:         Right eye: No discharge.         Left eye: No discharge.      Conjunctiva/sclera: Conjunctivae normal.      Pupils: Pupils are equal, round, and reactive to light.   Neck:      Thyroid: No thyroid mass, thyromegaly or thyroid tenderness.   Cardiovascular:      Rate and Rhythm: Normal rate and regular rhythm.      Pulses: Normal pulses.      Heart sounds: Normal heart sounds. No murmur heard.  Pulmonary:      Effort: Pulmonary effort is normal. No respiratory distress.      Breath sounds: Normal breath sounds.   Abdominal:      General: Abdomen is flat. Bowel sounds are normal. There is no distension.      Palpations: Abdomen is soft.      Tenderness: There is no abdominal tenderness.   Musculoskeletal:         General: No swelling or tenderness. Normal range of motion.      Cervical back: Normal range of motion and neck supple. No tenderness.      Right lower leg: No edema.      Left lower leg: No edema.   Feet:      Right foot:      Skin integrity: Skin integrity normal.      Left foot:      Skin integrity: Skin integrity normal.   Lymphadenopathy:      Cervical: No cervical adenopathy.   Skin:     General: Skin is warm and dry.      Coloration: Skin is not jaundiced or pale.   Neurological:      General: No focal deficit present.      Mental Status: She is alert and oriented to person, place, and time. Mental status is at baseline.      Coordination: Coordination normal.      Gait: Gait normal.   Psychiatric:         Mood and Affect: Mood normal.         Behavior: Behavior normal.         Thought Content: Thought content normal.       Assessment:       Problem List Items Addressed This Visit    None  Visit Diagnoses       Papillary thyroid carcinoma    -  Primary     Postsurgical hypothyroidism        Postsurgical hypoparathyroidism                Plan:       Papillary thyroid carcinoma  TSH 0.2709, Free T4 0.75 Free T3 4.54 on 07/08/2022   On Bivalve 90 mcg   Thyroglobulin AB levels <1.8   RTC 5 months   Component Ref Range & Units 1 mo ago   (7/8/22) 5 mo ago   (3/31/22) 1 yr ago   (8/12/21) 1 yr ago   (5/25/21) 1 yr ago   (4/29/21) 1 yr ago   (4/1/21) 1 yr ago   (10/26/20)   Thyroid Stimulating Hormone 0.3500 - 4.9400 uIU/mL 0.2709 Low   0.0761 R  0.9752  1.2182  0.8451  1.2750  0.1567 Low     Resulting Agency  [facility]      Component Ref Range & Units 1 mo ago   (7/8/22) 3 mo ago   (6/2/22) 5 mo ago   (3/31/22) 1 yr ago   (4/29/21) 1 yr ago   (4/1/21) 1 yr ago   (10/26/20) 2 yr ago   (8/25/20)   Thyroxine Free 0.70 - 1.48 ng/dL 0.75  0.54 Low   1.08 R  0.85  0.87  1.15  1.22 R      Component Ref Range & Units 1 mo ago   (7/8/22) 5 mo ago   (3/31/22) 1 yr ago   (4/29/21) 1 yr ago   (4/1/21) 2 yr ago   (6/22/20)   T3 Free 1.57 - 3.91 pg/mL 4.54 High   3.08 R  3.07 R  2.57 R  3.34 R      [place] Generic Orderable SEE COMMENTS    Comment:     Test                                 Result      Flag  Unit   RefValue   ----------------------------------------------------------------------   Thyroglobulin Reflex to MS or IA     Thyroglobulin Antibody, S          <1.8              IU/mL  <1.8       Thyroglobulin Antibody < 1.8 IU/mL. Thyroglobulin   performed by Immunoassay to follow.       Test Performed by:   [facility]   [address] [place] [address]   : Randy Cortez M.D. Ph.D.; CLIA# [id number]     Postsurgical hypothyroidism  See Above     Postsurgical hypoparathyroidism  Calcium 8.0 on 06/02/2022   Occasional symptoms of numbness  Continue current calcitriol 0.25 once a day and calcium citrate once at night (citrate only when patient has  symptoms)

## 2022-09-05 PROBLEM — J01.90 SINUSITIS, ACUTE: Status: RESOLVED | Noted: 2021-03-30 | Resolved: 2022-09-05

## 2022-09-16 ENCOUNTER — HISTORICAL (OUTPATIENT)
Dept: ADMINISTRATIVE | Facility: HOSPITAL | Age: 32
End: 2022-09-16
Payer: MEDICAID

## 2022-09-21 ENCOUNTER — HISTORICAL (OUTPATIENT)
Dept: ADMINISTRATIVE | Facility: HOSPITAL | Age: 32
End: 2022-09-21
Payer: MEDICAID

## 2022-12-22 ENCOUNTER — TELEPHONE (OUTPATIENT)
Dept: ENDOCRINOLOGY | Facility: CLINIC | Age: 32
End: 2022-12-22
Payer: MEDICAID

## 2022-12-22 DIAGNOSIS — E89.0 POSTSURGICAL HYPOTHYROIDISM: ICD-10-CM

## 2022-12-22 RX ORDER — THYROID 90 MG/1
90 TABLET ORAL
Qty: 30 TABLET | Refills: 6 | Status: SHIPPED | OUTPATIENT
Start: 2022-12-22 | End: 2023-02-10

## 2022-12-22 NOTE — TELEPHONE ENCOUNTER
----- Message from Nnig Ramos sent at 12/22/2022  1:31 PM CST -----  Regarding: Lee PAULINO PT         Pt is requesting a refill on ARMOUR THYROID 90 mg Tab  [facility]   Pt # [phone]

## 2023-01-05 ENCOUNTER — OFFICE VISIT (OUTPATIENT)
Dept: OTOLARYNGOLOGY | Facility: CLINIC | Age: 33
End: 2023-01-05
Payer: MEDICAID

## 2023-01-05 VITALS
TEMPERATURE: 99 F | HEART RATE: 108 BPM | HEIGHT: 69 IN | RESPIRATION RATE: 16 BRPM | BODY MASS INDEX: 21.92 KG/M2 | WEIGHT: 148 LBS | SYSTOLIC BLOOD PRESSURE: 137 MMHG | DIASTOLIC BLOOD PRESSURE: 83 MMHG

## 2023-01-05 DIAGNOSIS — C73 PAPILLARY THYROID CARCINOMA: Primary | ICD-10-CM

## 2023-01-05 DIAGNOSIS — R13.10 DYSPHAGIA, UNSPECIFIED TYPE: ICD-10-CM

## 2023-01-05 DIAGNOSIS — E89.0 S/P TOTAL THYROIDECTOMY: ICD-10-CM

## 2023-01-05 PROCEDURE — 99213 OFFICE O/P EST LOW 20 MIN: CPT | Mod: PBBFAC | Performed by: STUDENT IN AN ORGANIZED HEALTH CARE EDUCATION/TRAINING PROGRAM

## 2023-01-05 NOTE — PROGRESS NOTES
[facility]  Otolaryngology-Head & Neck Surgery    Office Visit  Raza Cerda  [id number]  1990    CC: Papillary thyroid s/p total thyroidectomy     HPI: Raza Cerda is a 32 y.o. female with pmhx of total thyroidectomy for PTC in 2020 with Dr Santoyo. Follows in clinic yearly    1/5/23: Feels like shes noticing a change in her swallow, has concern could be related to prior thyroid site. Otherwise doing well, follows regularly with endocrine. Currently on armour.     ROS:   General: Negative except per HPI  Skin: Denies rash, ulcer, or lesion.  Eyes: Denies vision changes or diplopia.  Ears: Negative except per HPI  Nose: Negative except per HPI  Throat/mouth: Negative except per HPI  Cardiovascular: Negative except per HPI  Respiratory: Negative except per HPI  Neck: Negative except per HPI  Endocrine: Negative except per HPI  Neurologic: Negative except per HPI    Review of patient's allergies indicates:   Allergen Reactions    Penicillanic sulfone bl beta-lactamase inhibitors     Penicillins        Past Medical History:   Diagnosis Date    Hypothyroidism        History reviewed. No pertinent surgical history.    Social History     Socioeconomic History    Marital status: Unknown   Tobacco Use    Smoking status: Never    Smokeless tobacco: Never   Substance and Sexual Activity    Alcohol use: Not Currently    Drug use: Never    Sexual activity: Yes       Family History   Problem Relation Age of Onset    Cancer Father        Outpatient Encounter Medications as of 1/5/2023   Medication Sig Dispense Refill    buPROPion (WELLBUTRIN XL) 150 MG TB24 tablet Take 150 mg by mouth once daily.      calcitRIOL (ROCALTROL) 0.25 MCG Cap Take 1 capsule (0.25 mcg total) by mouth once daily. 30 capsule 11    thyroid, pork, (ARMOUR THYROID) 90 mg Tab Take 1 tablet (90 mg total) by mouth before breakfast. 30 tablet 6    hydrocodone bit/homatrop me-br (HYDROCODONE-HOMATROPINE ORAL)  Hydrocodone Compound Take No date recorded No form recorded No frequency recorded No route recorded No set duration recorded No set duration amount recorded suspended No dosage strength recorded No dosage strength units of measure recorded      SULFAMETHOXAZOLE, BULK, MISC sulfamethoxazole (bulk) Take No date recorded No form recorded No frequency recorded No route recorded No set duration recorded No set duration amount recorded suspended No dosage strength recorded No dosage strength units of measure recorded      ZOLOFT 25 mg tablet Take 25 mg by mouth once daily.       No facility-administered encounter medications on file as of 1/5/2023.       PHYSICAL EXAM:  Vitals:    01/05/23 1027   BP: 137/83   Pulse: 108   Resp: 16   Temp: 98.5 °F (36.9 °C)       General Appearance: well nourished, well-developed, alert, oriented, in no acute distress  Head/Face: NC, AT  Eyes: PEERLA, EOMI, normal conjunctiva  Ears: Hears well at normal conversation volume  AD: external normal, ear canal normal, TM intact, no middle ear fluid  AS: external normal, ear canal normal, TM intact, no middle ear fluid  Nose: septum midline, no inferior turbinate hypertrophy, no polyps  OC/OP: dentition moderate, no oral lesions, FOM and BOT soft  Nasopharynx, Hypopharynx, and Larynx: indirect visualization attempted, limited view due to patient intolerance  Neck: soft, non-tender, no palpable lymph nodes, thyroid surgical absent, scar well healing, no obvious residual tissue/LN/nodules palpable   Neuro: CN II - XII intact  Psychiatric: oriented to time, place and person, no depression, anxiety or agitation      ASSESSMENT:  Raza Cerda is a 32 y.o. female with pmhx of total thyroidectomy for PTC in 2020 with Dr Santoyo. Follows in clinic yearly. With new changes to swallow which are concerning to patient as reminds her of thyroid dx. Prior US negative, but will obtain new US given symptom    PLAN:  -- US thyroid/neck  -- Remainder of  hypothyroidism management per endocrinology  -- Tele visit to discuss US in 4 weeks    RTC 1 year    Noelle Randall MD  [facility]  10:42 AM 01/05/2023

## 2023-01-17 NOTE — PROGRESS NOTES
I reviewed the history and physical exam with the resident.   I agree with findings and plan.    Jey Craft M.D.

## 2023-01-19 ENCOUNTER — LAB REQUISITION (OUTPATIENT)
Dept: LAB | Facility: HOSPITAL | Age: 33
End: 2023-01-19
Payer: MEDICAID

## 2023-01-19 DIAGNOSIS — R10.2 PELVIC AND PERINEAL PAIN: ICD-10-CM

## 2023-01-19 LAB
APPEARANCE UR: CLEAR
BACTERIA #/AREA URNS AUTO: ABNORMAL /HPF
BILIRUB UR QL STRIP.AUTO: NEGATIVE MG/DL
COLOR UR AUTO: YELLOW
GLUCOSE UR QL STRIP.AUTO: NEGATIVE MG/DL
KETONES UR QL STRIP.AUTO: NEGATIVE MG/DL
LEUKOCYTE ESTERASE UR QL STRIP.AUTO: ABNORMAL UNIT/L
NITRITE UR QL STRIP.AUTO: NEGATIVE
PH UR STRIP.AUTO: 7.5 [PH]
PROT UR QL STRIP.AUTO: NEGATIVE MG/DL
RBC #/AREA URNS AUTO: <5 /HPF
RBC UR QL AUTO: NEGATIVE UNIT/L
SP GR UR STRIP.AUTO: 1.01 (ref 1–1.03)
SQUAMOUS #/AREA URNS AUTO: <5 /HPF
UROBILINOGEN UR STRIP-ACNC: 0.2 MG/DL
WBC #/AREA URNS AUTO: 7 /HPF

## 2023-01-19 PROCEDURE — 87088 URINE BACTERIA CULTURE: CPT | Performed by: STUDENT IN AN ORGANIZED HEALTH CARE EDUCATION/TRAINING PROGRAM

## 2023-01-19 PROCEDURE — 87077 CULTURE AEROBIC IDENTIFY: CPT | Performed by: STUDENT IN AN ORGANIZED HEALTH CARE EDUCATION/TRAINING PROGRAM

## 2023-01-19 PROCEDURE — 81001 URINALYSIS AUTO W/SCOPE: CPT | Performed by: STUDENT IN AN ORGANIZED HEALTH CARE EDUCATION/TRAINING PROGRAM

## 2023-01-21 LAB — BACTERIA UR CULT: ABNORMAL

## 2023-01-23 ENCOUNTER — TELEPHONE (OUTPATIENT)
Dept: ENDOCRINOLOGY | Facility: CLINIC | Age: 33
End: 2023-01-23
Payer: MEDICAID

## 2023-01-23 DIAGNOSIS — E89.0 POSTSURGICAL HYPOTHYROIDISM: ICD-10-CM

## 2023-01-23 DIAGNOSIS — Z85.850 HISTORY OF PAPILLARY ADENOCARCINOMA OF THYROID: Primary | ICD-10-CM

## 2023-01-23 DIAGNOSIS — E89.2 POSTSURGICAL HYPOPARATHYROIDISM: ICD-10-CM

## 2023-01-23 NOTE — TELEPHONE ENCOUNTER
----- Message from Ning Ramos sent at 1/23/2023 11:10 AM CST -----  Regarding: Labs  JEFFERSON PT       Pt wants to know if lab work is needed before the appt on 1/30/23. Please advise

## 2023-01-30 ENCOUNTER — PATIENT MESSAGE (OUTPATIENT)
Dept: ENDOCRINOLOGY | Facility: CLINIC | Age: 33
End: 2023-01-30

## 2023-02-10 ENCOUNTER — TELEPHONE (OUTPATIENT)
Dept: ENDOCRINOLOGY | Facility: CLINIC | Age: 33
End: 2023-02-10
Payer: MEDICAID

## 2023-02-10 ENCOUNTER — PATIENT MESSAGE (OUTPATIENT)
Dept: ENDOCRINOLOGY | Facility: CLINIC | Age: 33
End: 2023-02-10
Payer: MEDICAID

## 2023-02-10 DIAGNOSIS — E89.0 POSTSURGICAL HYPOTHYROIDISM: Primary | ICD-10-CM

## 2023-02-10 RX ORDER — THYROID 15 MG/1
15 TABLET ORAL
Qty: 30 TABLET | Refills: 4 | Status: SHIPPED | OUTPATIENT
Start: 2023-02-10 | End: 2023-08-21

## 2023-02-10 RX ORDER — THYROID 60 MG/1
60 TABLET ORAL
Qty: 30 TABLET | Refills: 4 | Status: SHIPPED | OUTPATIENT
Start: 2023-02-10 | End: 2023-09-21

## 2023-03-06 ENCOUNTER — LAB VISIT (OUTPATIENT)
Dept: LAB | Facility: HOSPITAL | Age: 33
End: 2023-03-06
Attending: NURSE PRACTITIONER
Payer: MEDICAID

## 2023-03-06 DIAGNOSIS — E89.0 POSTSURGICAL HYPOTHYROIDISM: ICD-10-CM

## 2023-03-06 LAB
T3FREE SERPL-MCNC: 4.56 PG/ML (ref 1.57–3.91)
T4 FREE SERPL-MCNC: 0.67 NG/DL (ref 0.7–1.48)
TSH SERPL-ACNC: 0.2 UIU/ML (ref 0.35–4.94)

## 2023-03-06 PROCEDURE — 36415 COLL VENOUS BLD VENIPUNCTURE: CPT

## 2023-03-06 PROCEDURE — 84481 FREE ASSAY (FT-3): CPT

## 2023-03-06 PROCEDURE — 84432 ASSAY OF THYROGLOBULIN: CPT

## 2023-03-06 PROCEDURE — 84443 ASSAY THYROID STIM HORMONE: CPT

## 2023-03-06 PROCEDURE — 84439 ASSAY OF FREE THYROXINE: CPT

## 2023-03-07 ENCOUNTER — OFFICE VISIT (OUTPATIENT)
Dept: ENDOCRINOLOGY | Facility: CLINIC | Age: 33
End: 2023-03-07
Payer: MEDICAID

## 2023-03-07 DIAGNOSIS — E89.2 POSTSURGICAL HYPOPARATHYROIDISM: ICD-10-CM

## 2023-03-07 DIAGNOSIS — C73 PAPILLARY THYROID CARCINOMA: Primary | ICD-10-CM

## 2023-03-07 DIAGNOSIS — E89.0 POSTSURGICAL HYPOTHYROIDISM: ICD-10-CM

## 2023-03-07 LAB
ENDOCRINOLOGIST REVIEW: ABNORMAL
THYROGLOB AB SERPL-ACNC: <1.8 IU/ML
THYROGLOB SERPL-MCNC: 0.9 NG/ML

## 2023-03-07 PROCEDURE — 1159F MED LIST DOCD IN RCRD: CPT | Mod: CPTII,95,, | Performed by: NURSE PRACTITIONER

## 2023-03-07 PROCEDURE — 1159F PR MEDICATION LIST DOCUMENTED IN MEDICAL RECORD: ICD-10-PCS | Mod: CPTII,95,, | Performed by: NURSE PRACTITIONER

## 2023-03-07 PROCEDURE — 99214 PR OFFICE/OUTPT VISIT, EST, LEVL IV, 30-39 MIN: ICD-10-PCS | Mod: 95,,, | Performed by: NURSE PRACTITIONER

## 2023-03-07 PROCEDURE — 99214 OFFICE O/P EST MOD 30 MIN: CPT | Mod: 95,,, | Performed by: NURSE PRACTITIONER

## 2023-03-07 RX ORDER — NITROFURANTOIN MONOHYDRATE/MACROCRYSTALLINE 25; 75 MG/1; MG/1
100 CAPSULE ORAL 2 TIMES DAILY
COMMUNITY
Start: 2023-01-26 | End: 2023-08-08

## 2023-03-07 RX ORDER — LEVONORGESTREL 52 MG/1
INTRAUTERINE DEVICE INTRAUTERINE
COMMUNITY
Start: 2022-10-26

## 2023-03-07 NOTE — PROGRESS NOTES
Patient ID: Raza Cerda is a 32 y.o. female.    Chief Complaint: Post surgical hypothyroid    Patient is dnlvowezy13/30/2021 Endocrine Clinic Note: Ms. Cerda is a 31 y/o female with a PMH of stage I thyroid cancer s/p thyroidectomy 5/20 w/ comorbid postsurgical hypothyroidism and hypoparathyroidism. Patient has history of hypoparathyroidism post surgery. Today, patient states she is doing well, her only complaints are mild cold intolerance and some tingling of her hands and feet every 1-2 days, in the past she has complained of palpitations and tremors, however these are now resolved. She does not take extra calcium for symptoms, and states she experiences the tremors mainly before she takes her morning Calcitriol. She remains on Tirosint and is complaint with her medication. She is complaint with calcitriol and calcium carbonate. Her TSH remains suppressed post surgically and is in the acceptable range for this stage of post-op treatment. Thyroglobulin and thyroglobulin antibody were negative last visit in August. Repeat thyroglobulin and thyroglobulin antibody pending today. Patient has no other concerns or complaints today. Patient is to return to clinic in 6 months for continued monitoring. She will have labs and urine studies completed before this visit.  [1]      04/01/2021 Endocrine Clinic Note: 31 y/o female scheduled today for Endocrine F/U. with a PMH of stage I thyroid cancer s/p thyroidectomy 5/20 w/ comorbid postsurgical hypothyroidism and hypoparathyroidism.  Patient reports to clinic today recently finding out she is 5 weeks pregnant.  Patient states her hCG levels were drawn and matched her being 4 to 5 weeks and she states her periods have been regular and pregnancy dates are accurate. Postsurgical hypothyroidism TSH was 1.2750, free T4 0.87, T4 9.52, free T3 2.57 patient reports fatigue.  She denies any missed medication doses and states she takes her medication daily on an empty  stomach 30 minutes before any food or beverages with water only.  Hypoparathyroidism patient's current calcium level is 9.7, albumin 3.5 corrected calcium 10.1 phosphorus 3.9 patient is currently on calcitriol 25 mcg 1 tablet twice daily and also calcium carbonate 500 mg 1 tablet daily. She denies any tingling to hands or feet, muscle weakness or spasms.      Thyroid cancer US 03/30/2021 FINDINGS:  The thyroid gland has been removed. No abnormal soft tissue is seen in the bed of the resected thyroid gland. No abnormal lymph nodes are seen within the visualized portions of the neck. No other significant findings. IMPRESSION: 1. No significant abnormalities are identified. [1]      07/16/2021 Endocrine Clinic note: Mrs. Cerda is a 31 y.o. F with PMH Papillary thyroid carcinoma s/p Complete Thyroidectomy complicated by Post-surgical Hypothyroidism and Post-surgical Hypoparathyroidism, Pregnancy at 23 WGA, and Anxiety returning to the Endocrinology Clinic for Follow-up. She denies any headache, muscle weakness, muscle spasms, nausea, emesis, constipation, diarrhea, CP, dyspnea, cold intolerance, heat intolerance, nor headaches; + for fatigue attributable to current pregnancy. She also complains of intermittent parasthesia of R hand which is relieved by PRN calcium carbonate, currently not taking scheduling calcium carbonate.  She states she is feeling well today aside from being tired which she attributes to pregnancy. [2]      08/16/2021 Endocrine Clinic Note: 31 year old female scheduled today as Endocrine Clinic F/U. HX of Papillary thyroid carcinoma s/p Complete Thyroidectomy complicated by Post-surgical Hypothyroidism and Post-surgical Hypoparathyroidism, Pregnancy at 28 week today.  Current Calcium level 7.4,  Albumin 2.5 corrected calcium 8.6 , Phos 4.0. Pt reports occasional right hand numbness at night but only takes her calcium Citrate as needed when she had symptoms with numbness. She reports fatigue  currently but attributes it to pregnancy. She is currently on calcitriol 0.25 mg Once a day and calcium citrate once at night (only takes with numbness). Low albumin pt states her GYN is monitoring. Previous Vitamin D level 57.3 on 04/01/2021. [1]      10/08/2021 Telemedicine Visit Note-Endocrine Clinic: 30 y/o female scheduled today for endocrine clinic follow-up.  History of papillary thyroid carcinoma s/p complete thyroidectomy complicated by postsurgical hypoparathyroidism and hypothyroidism.  Patient is currently 36 weeks pregnant. Previous labs  Calcium level 7.4,  Albumin 2.5 corrected calcium 8.6 , Phos 4.0 on 08/12/2021 Hypothyroidism previous TSH 0.975 on 08/12/2021.  Patient had labs completed she says she reported to [facility] lab on Wednesday no labs noted in chart and orders canceled.  Lab was called states no specimen was received patient was instructed that we will need to repeat labs.  Patient reports some numbness when sitting on the toilet to her legs so she is unsure if it is due to low calcium levels are being 36 weeks pregnant.  She states that time when she is laying flat on her back she has numbness to her hands and legs.  Patient reports ability to sit but recently was able to have from the pharmacy and has been on generic levothyroxine.  She will be repeating her labs today.  Instructed patient we will ensure she is at goal for delivery.     01/27/2022 Endocrine Clinic Note: 31-year-old female scheduled today for endocrine clinic follow-up.  History of papillary thyroid carcinoma /p complete thyroidectomy complicated by postsurgical hypoparathyroidism and hypothyroidism.  Patient was previously pregnant and delivered on Nov 6th, 2021.  Papillary thyroid carcinoma/postsurgical hypothyroidism current TSH 0.018, free T4 1.63, T4 10.6.  Currently not at goal goal 0.1-0.5. Pt is currently on Tirosint 112 mcg  Postsurgical hypoparathyroidism patient is currently on calcitriol 0.25 mcg once a day and  calcium citrate at night when symptomatic. She denies any symptoms of tinging or numbness.  She states since delivery she has not had as many symptoms and has not had to take her nighttime calcium citrate often. Patient previously had thyroglobulin antibody which was  < 1.0 on 08/25/2020 and had increased to 2.3 on 10/26/2020 and was trending down on 04/29/2021 to 1.5.  Patient to repeat thyroglobulin levels today.    Telemedicine visit note 03/07/2023 endocrine clinic  I have reviewed the patient name, YOB: 1990  I have verified that this patient is in the state [facility]  NP location: [facility] endocrine clinic  Patient location:  Home  No other individuals present on call  Telemedicine visit utilized today  (audio/video)   Privacy discussed while using telemedicine grant     Start 14:25 Stopped 14:45    32-year-old female scheduled today for endocrine clinic follow-up.  History of papillary thyroid carcinoma with complete thyroidectomy with postsurgical hypothyroidism and hypoparathyroidism. Papillary thyroid carcinoma/ postsurgical hypothyroidism current TSH 0.203 currently at goal of 0.1-0.5 with history of thyroid cancer.  Patient previously was on Tirosint and was complaining of fatigue and was asking to be switched to [place] Thyroid currently on current thyroid.  Patient currently has fatigue but unable to assess why this due to her work schedule and having 2 children to her thyroid but has no complaints be on [place] thyroid today.  Currently patient's thyroglobulin panel is pending Patient previously had thyroglobulin antibody which was  < 1.0 on 08/25/2020 and had increased to 2.3 on 10/26/2020 and was trending down on 04/29/2021 to 1.5.  Postsurgical hypoparathyroidism patient is currently on calcitriol 0.25 mg once a day she denies any symptoms of hypocalcemia.  She denies any muscle tingling or cramps.         Review of Systems   Constitutional:  Negative for activity change, appetite change  and fatigue.   HENT:  Negative for dental problem, hearing loss, tinnitus, trouble swallowing and goiter.    Eyes:  Negative for photophobia, pain and visual disturbance.   Respiratory:  Negative for cough, chest tightness and wheezing.    Cardiovascular:  Negative for chest pain, palpitations and leg swelling.   Gastrointestinal:  Negative for abdominal pain, constipation, diarrhea, nausea and reflux.   Endocrine: Negative for cold intolerance, heat intolerance, polydipsia and polyphagia.   Genitourinary:  Negative for difficulty urinating, flank pain, hematuria, hot flashes, menstrual irregularity, menstrual problem, nocturia and urgency.   Musculoskeletal:  Negative for back pain, gait problem, joint swelling, leg pain and joint deformity.   Integumentary:  Negative for color change, pallor, rash and breast discharge.   Allergic/Immunologic: Negative for environmental allergies, food allergies and immunocompromised state.   Neurological:  Negative for tremors, seizures, headaches, coordination difficulties, memory loss and coordination difficulties.   Psychiatric/Behavioral:  Negative for agitation, behavioral problems and sleep disturbance. The patient is not nervous/anxious.        Objective:      Physical Exam  Constitutional:       Appearance: Normal appearance.   HENT:      Head: Normocephalic.      Nose: Nose normal.   Eyes:      Conjunctiva/sclera: Conjunctivae normal.   Pulmonary:      Effort: Pulmonary effort is normal.   Musculoskeletal:      Cervical back: Normal range of motion.   Neurological:      Mental Status: She is alert.   Psychiatric:         Mood and Affect: Mood normal.         Behavior: Behavior normal.         Thought Content: Thought content normal.         Judgment: Judgment normal.       Assessment:       Problem List Items Addressed This Visit    None  Visit Diagnoses       Papillary thyroid carcinoma    -  Primary    Postsurgical hypothyroidism        Postsurgical hypoparathyroidism                 Plan:       Papillary thyroid carcinoma  TSH 0.203 at goal (0.1-0.5)   Pending Thyroglobulin panel   On [place] thyroid 75 mg per day  Return to clinic in 4 months telehealth  Component Ref Range & Units 1 d ago  (3/6/23) 6 mo ago  (8/31/22) 8 mo ago  (7/8/22) 11 mo ago  (3/31/22) 1 yr ago  (8/12/21) 1 yr ago  (5/25/21) 1 yr ago  (4/29/21)   Thyroid Stimulating Hormone 0.350 - 4.940 uIU/mL 0.203 Low   0.2419 Low  R  0.2709 Low  R  0.0761 R  0.9752 R  1.2182 R  0.8451 R     Postsurgical hypothyroidism  See above    Postsurgical hypoparathyroidism  Continue calcitriol 0.5 mg per day  Asymptomatic  BMP on follow-up visit

## 2023-03-08 ENCOUNTER — PATIENT MESSAGE (OUTPATIENT)
Dept: ENDOCRINOLOGY | Facility: CLINIC | Age: 33
End: 2023-03-08
Payer: MEDICAID

## 2023-05-19 ENCOUNTER — PATIENT MESSAGE (OUTPATIENT)
Dept: ENDOCRINOLOGY | Facility: CLINIC | Age: 33
End: 2023-05-19
Payer: MEDICAID

## 2023-08-07 ENCOUNTER — LAB VISIT (OUTPATIENT)
Dept: LAB | Facility: HOSPITAL | Age: 33
End: 2023-08-07
Attending: NURSE PRACTITIONER
Payer: MEDICAID

## 2023-08-07 DIAGNOSIS — E89.2 POSTSURGICAL HYPOPARATHYROIDISM: ICD-10-CM

## 2023-08-07 DIAGNOSIS — C73 PAPILLARY THYROID CARCINOMA: ICD-10-CM

## 2023-08-07 LAB
ALBUMIN SERPL-MCNC: 4.1 G/DL (ref 3.5–5)
BUN SERPL-MCNC: 9.3 MG/DL (ref 7–18.7)
CALCIUM SERPL-MCNC: 7.7 MG/DL (ref 8.4–10.2)
CHLORIDE SERPL-SCNC: 104 MMOL/L (ref 98–107)
CO2 SERPL-SCNC: 29 MMOL/L (ref 22–29)
CREAT SERPL-MCNC: 0.75 MG/DL (ref 0.55–1.02)
DEPRECATED CALCIDIOL+CALCIFEROL SERPL-MC: 37.2 NG/ML (ref 30–80)
GFR SERPLBLD CREATININE-BSD FMLA CKD-EPI: >60 MLS/MIN/1.73/M2
GLUCOSE SERPL-MCNC: 83 MG/DL (ref 74–100)
PHOSPHATE SERPL-MCNC: 4.4 MG/DL (ref 2.3–4.7)
POTASSIUM SERPL-SCNC: 3.9 MMOL/L (ref 3.5–5.1)
SODIUM SERPL-SCNC: 142 MMOL/L (ref 136–145)
TSH SERPL-ACNC: 4.09 UIU/ML (ref 0.35–4.94)

## 2023-08-07 PROCEDURE — 84443 ASSAY THYROID STIM HORMONE: CPT

## 2023-08-07 PROCEDURE — 82306 VITAMIN D 25 HYDROXY: CPT

## 2023-08-07 PROCEDURE — 36415 COLL VENOUS BLD VENIPUNCTURE: CPT

## 2023-08-07 PROCEDURE — 80069 RENAL FUNCTION PANEL: CPT

## 2023-08-08 ENCOUNTER — OFFICE VISIT (OUTPATIENT)
Dept: ENDOCRINOLOGY | Facility: CLINIC | Age: 33
End: 2023-08-08
Payer: MEDICAID

## 2023-08-08 DIAGNOSIS — E89.0 POSTSURGICAL HYPOTHYROIDISM: ICD-10-CM

## 2023-08-08 DIAGNOSIS — E89.2 POSTSURGICAL HYPOPARATHYROIDISM: ICD-10-CM

## 2023-08-08 DIAGNOSIS — Z85.850 HISTORY OF PAPILLARY ADENOCARCINOMA OF THYROID: Primary | ICD-10-CM

## 2023-08-08 PROCEDURE — 1160F RVW MEDS BY RX/DR IN RCRD: CPT | Mod: CPTII,95,, | Performed by: NURSE PRACTITIONER

## 2023-08-08 PROCEDURE — 1160F PR REVIEW ALL MEDS BY PRESCRIBER/CLIN PHARMACIST DOCUMENTED: ICD-10-PCS | Mod: CPTII,95,, | Performed by: NURSE PRACTITIONER

## 2023-08-08 PROCEDURE — 99214 PR OFFICE/OUTPT VISIT, EST, LEVL IV, 30-39 MIN: ICD-10-PCS | Mod: 95,,, | Performed by: NURSE PRACTITIONER

## 2023-08-08 PROCEDURE — 1159F MED LIST DOCD IN RCRD: CPT | Mod: CPTII,95,, | Performed by: NURSE PRACTITIONER

## 2023-08-08 PROCEDURE — 99214 OFFICE O/P EST MOD 30 MIN: CPT | Mod: 95,,, | Performed by: NURSE PRACTITIONER

## 2023-08-08 PROCEDURE — 1159F PR MEDICATION LIST DOCUMENTED IN MEDICAL RECORD: ICD-10-PCS | Mod: CPTII,95,, | Performed by: NURSE PRACTITIONER

## 2023-08-08 RX ORDER — LEVOTHYROXINE SODIUM 112 UG/1
TABLET ORAL
COMMUNITY
End: 2023-08-21

## 2023-08-08 RX ORDER — CALCITRIOL 0.5 UG/1
CAPSULE ORAL
COMMUNITY
End: 2023-12-07

## 2023-08-08 NOTE — PROGRESS NOTES
Subjective     Patient ID: Raza Cerda is a 33 y.o. female.    Chief Complaint: Thyroid Cancer    Patient is ykyesizmd70/30/2021 Endocrine Clinic Note: Ms. Cerda is a 29 y/o female with a PMH of stage I thyroid cancer s/p thyroidectomy 5/20 w/ comorbid postsurgical hypothyroidism and hypoparathyroidism. Patient has history of hypoparathyroidism post surgery. Today, patient states she is doing well, her only complaints are mild cold intolerance and some tingling of her hands and feet every 1-2 days, in the past she has complained of palpitations and tremors, however these are now resolved. She does not take extra calcium for symptoms, and states she experiences the tremors mainly before she takes her morning Calcitriol. She remains on Tirosint and is complaint with her medication. She is complaint with calcitriol and calcium carbonate. Her TSH remains suppressed post surgically and is in the acceptable range for this stage of post-op treatment. Thyroglobulin and thyroglobulin antibody were negative last visit in August. Repeat thyroglobulin and thyroglobulin antibody pending today. Patient has no other concerns or complaints today. Patient is to return to clinic in 6 months for continued monitoring. She will have labs and urine studies completed before this visit.  [1]      04/01/2021 Endocrine Clinic Note: 29 y/o female scheduled today for Endocrine F/U. with a PMH of stage I thyroid cancer s/p thyroidectomy 5/20 w/ comorbid postsurgical hypothyroidism and hypoparathyroidism.  Patient reports to clinic today recently finding out she is 5 weeks pregnant.  Patient states her hCG levels were drawn and matched her being 4 to 5 weeks and she states her periods have been regular and pregnancy dates are accurate. Postsurgical hypothyroidism TSH was 1.2750, free T4 0.87, T4 9.52, free T3 2.57 patient reports fatigue.  She denies any missed medication doses and states she takes her medication daily on an empty  stomach 30 minutes before any food or beverages with water only.  Hypoparathyroidism patient's current calcium level is 9.7, albumin 3.5 corrected calcium 10.1 phosphorus 3.9 patient is currently on calcitriol 25 mcg 1 tablet twice daily and also calcium carbonate 500 mg 1 tablet daily. She denies any tingling to hands or feet, muscle weakness or spasms.      Thyroid cancer US 03/30/2021 FINDINGS:  The thyroid gland has been removed. No abnormal soft tissue is seen in the bed of the resected thyroid gland. No abnormal lymph nodes are seen within the visualized portions of the neck. No other significant findings. IMPRESSION: 1. No significant abnormalities are identified. [1]      07/16/2021 Endocrine Clinic note: Mrs. Cerda is a 31 y.o. F with PMH Papillary thyroid carcinoma s/p Complete Thyroidectomy complicated by Post-surgical Hypothyroidism and Post-surgical Hypoparathyroidism, Pregnancy at 23 WGA, and Anxiety returning to the Endocrinology Clinic for Follow-up. She denies any headache, muscle weakness, muscle spasms, nausea, emesis, constipation, diarrhea, CP, dyspnea, cold intolerance, heat intolerance, nor headaches; + for fatigue attributable to current pregnancy. She also complains of intermittent parasthesia of R hand which is relieved by PRN calcium carbonate, currently not taking scheduling calcium carbonate.  She states she is feeling well today aside from being tired which she attributes to pregnancy. [2]      08/16/2021 Endocrine Clinic Note: 31 year old female scheduled today as Endocrine Clinic F/U. HX of Papillary thyroid carcinoma s/p Complete Thyroidectomy complicated by Post-surgical Hypothyroidism and Post-surgical Hypoparathyroidism, Pregnancy at 28 week today.  Current Calcium level 7.4,  Albumin 2.5 corrected calcium 8.6 , Phos 4.0. Pt reports occasional right hand numbness at night but only takes her calcium Citrate as needed when she had symptoms with numbness. She reports fatigue  currently but attributes it to pregnancy. She is currently on calcitriol 0.25 mg Once a day and calcium citrate once at night (only takes with numbness). Low albumin pt states her GYN is monitoring. Previous Vitamin D level 57.3 on 04/01/2021. [1]      10/08/2021 Telemedicine Visit Note-Endocrine Clinic: 32 y/o female scheduled today for endocrine clinic follow-up.  History of papillary thyroid carcinoma s/p complete thyroidectomy complicated by postsurgical hypoparathyroidism and hypothyroidism.  Patient is currently 36 weeks pregnant. Previous labs  Calcium level 7.4,  Albumin 2.5 corrected calcium 8.6 , Phos 4.0 on 08/12/2021 Hypothyroidism previous TSH 0.975 on 08/12/2021.  Patient had labs completed she says she reported to [facility] lab on Wednesday no labs noted in chart and orders canceled.  Lab was called states no specimen was received patient was instructed that we will need to repeat labs.  Patient reports some numbness when sitting on the toilet to her legs so she is unsure if it is due to low calcium levels are being 36 weeks pregnant.  She states that time when she is laying flat on her back she has numbness to her hands and legs.  Patient reports ability to sit but recently was able to have from the pharmacy and has been on generic levothyroxine.  She will be repeating her labs today.  Instructed patient we will ensure she is at goal for delivery.     01/27/2022 Endocrine Clinic Note: 31-year-old female scheduled today for endocrine clinic follow-up.  History of papillary thyroid carcinoma /p complete thyroidectomy complicated by postsurgical hypoparathyroidism and hypothyroidism.  Patient was previously pregnant and delivered on Nov 6th, 2021.  Papillary thyroid carcinoma/postsurgical hypothyroidism current TSH 0.018, free T4 1.63, T4 10.6.  Currently not at goal goal 0.1-0.5. Pt is currently on Tirosint 112 mcg  Postsurgical hypoparathyroidism patient is currently on calcitriol 0.25 mcg once a day and  calcium citrate at night when symptomatic. She denies any symptoms of tinging or numbness.  She states since delivery she has not had as many symptoms and has not had to take her nighttime calcium citrate often. Patient previously had thyroglobulin antibody which was  < 1.0 on 08/25/2020 and had increased to 2.3 on 10/26/2020 and was trending down on 04/29/2021 to 1.5.  Patient to repeat thyroglobulin levels today.     Telemedicine visit note 03/07/2023 endocrine clinic  I have reviewed the patient name, YOB: 1990  I have verified that this patient is in the state [facility]  NP location: [facility] endocrine clinic  Patient location:  Home  No other individuals present on call  Telemedicine visit utilized today  (audio/video)   Privacy discussed while using telemedicine grant     Start 14:25 Stopped 14:45     32-year-old female scheduled today for endocrine clinic follow-up.  History of papillary thyroid carcinoma with complete thyroidectomy with postsurgical hypothyroidism and hypoparathyroidism. Papillary thyroid carcinoma/ postsurgical hypothyroidism current TSH 0.203 currently at goal of 0.1-0.5 with history of thyroid cancer.  Patient previously was on Tirosint and was complaining of fatigue and was asking to be switched to [place] Thyroid currently on current thyroid.  Patient currently has fatigue but unable to assess why this due to her work schedule and having 2 children to her thyroid but has no complaints be on [place] thyroid today.  Currently patient's thyroglobulin panel is pending Patient previously had thyroglobulin antibody which was  < 1.0 on 08/25/2020 and had increased to 2.3 on 10/26/2020 and was trending down on 04/29/2021 to 1.5.  Postsurgical hypoparathyroidism patient is currently on calcitriol 0.25 mg once a day she denies any symptoms of hypocalcemia.  She denies any muscle tingling or cramps.    The patient location is: Home  The chief complaint leading to consultation is: HX  Papillary Thyroid carcinoma     Visit type: audiovisual    Face to Face time with patient: 15  30 minutes of total time spent on the encounter, which includes face to face time and non-face to face time preparing to see the patient (eg, review of tests), Obtaining and/or reviewing separately obtained history, Documenting clinical information in the electronic or other health record, Independently interpreting results (not separately reported) and communicating results to the patient/family/caregiver, or Care coordination (not separately reported).         Each patient to whom he or she provides medical services by telemedicine is:  (1) informed of the relationship between the physician and patient and the respective role of any other health care provider with respect to management of the patient; and (2) notified that he or she may decline to receive medical services by telemedicine and may withdraw from such care at any time.    Endocrine Clinic Note 08/08/2023: 33 -year-old female scheduled today for endocrine clinic follow-up.  History of papillary thyroid carcinoma with complete thyroidectomy with postsurgical hypothyroidism and hypoparathyroidism. Papillary thyroid carcinoma/ postsurgical hypothyroidism current TSH increased to 4.089 from previous  0.203.  Patient previously was on Tirosint and had complaints of fatigue and asked to be switched to [place] Thyroid pt is currently on [place] 75 mg a day.   patient's thyroglobulin panel  03/06/2023 Thyroglobulin Ab <1.8 Thyroglobulin tumor marker S 0.9. Patient previously had thyroglobulin antibody which was  < 1.0 on 08/25/2020 and had increased to 2.3 on 10/26/2020 and was trending down on 04/29/2021 to 1.5.  Levels trending down. Postsurgical hypoparathyroidism patient is currently on calcitriol 0.25 mg once a day she denies any symptoms of hypocalcemia. Current Calcium level decreased to 7.7 from previous 8.4.  She denies any muscle tingling or cramps.  Patient  reports she was recently she started taking an iron supplement and was taking 2-3 hours after taking her thyroid medication.  Patient will stop the iron supplement and repeat TSH in 6 weeks TSH previously at goal patient's current dose of medication.  She denies any weight gain.                       Review of Systems   Constitutional:  Negative for activity change, appetite change and fatigue.   HENT:  Negative for dental problem, hearing loss, tinnitus, trouble swallowing and goiter.    Eyes:  Negative for photophobia, pain and visual disturbance.   Respiratory:  Negative for cough, chest tightness and wheezing.    Cardiovascular:  Negative for chest pain, palpitations and leg swelling.   Gastrointestinal:  Negative for abdominal pain, constipation, diarrhea, nausea and reflux.   Endocrine: Negative for cold intolerance, heat intolerance, polydipsia and polyphagia.   Genitourinary:  Negative for difficulty urinating, flank pain, hematuria, hot flashes, menstrual irregularity, menstrual problem, nocturia and urgency.   Musculoskeletal:  Negative for back pain, gait problem, joint swelling, leg pain and joint deformity.   Integumentary:  Negative for color change, pallor, rash and breast discharge.   Allergic/Immunologic: Negative for environmental allergies, food allergies and immunocompromised state.   Neurological:  Negative for tremors, seizures, headaches, coordination difficulties, memory loss and coordination difficulties.   Psychiatric/Behavioral:  Negative for agitation, behavioral problems and sleep disturbance. The patient is not nervous/anxious.           Objective     Physical Exam  Constitutional:       Appearance: Normal appearance.   HENT:      Head: Normocephalic.      Nose: Nose normal.   Eyes:      Conjunctiva/sclera: Conjunctivae normal.   Pulmonary:      Effort: Pulmonary effort is normal.   Musculoskeletal:      Cervical back: Normal range of motion.   Neurological:      Mental Status: She is  alert.   Psychiatric:         Mood and Affect: Mood normal.         Behavior: Behavior normal.         Thought Content: Thought content normal.         Judgment: Judgment normal.            Assessment and Plan     1. History of papillary adenocarcinoma of thyroid  TSH 0.203 at goal (0.1-0.5) on 08/07/2023  TSH 4.089 on 08/07/2023   On [place] thyroid 75 mg per day  Start iron supplement repeat TSH in 6 weeks  Return to clinic in 4 months Dr Arnold   Component Ref Range & Units 5 mo ago   Thyroglobulin Antibody, S <1.8 IU/mL <1.8    Thyroglobulin, Tumor Marker, S ng/mL 0.9 High       Component Ref Range & Units 1 d ago  (8/7/23) 5 mo ago  (3/6/23) 11 mo ago  (8/31/22) 1 yr ago  (7/8/22) 1 yr ago  (3/31/22) 1 yr ago  (8/12/21) 2 yr ago  (5/25/21)   Thyroid Stimulating Hormone 0.350 - 4.940 uIU/mL 4.089  0.203 Low   0.2419 Low  R  0.2709 Low  R  0.0761 R  0.9752 R  1.2182 R    -     TSH; Future; Expected date: 08/22/2023  -     Thyroglobulin; Future; Expected date: 08/22/2023    2. Postsurgical hypothyroidism  See Above   -     TSH; Future; Expected date: 08/22/2023      3. Postsurgical hypoparathyroidism  On calcitriol 0.25 mg per day (missed a few doses)   Asymptomatic  Calcium 7.7 on 08/07/2023 previous 8.4 on 08/31/2022  Use calcium carbonate over-the-counter once per day as needed   Component Ref Range & Units 1 d ago  (8/7/23) 11 mo ago  (8/31/22) 1 yr ago  (6/2/22) 1 yr ago  (8/12/21) 2 yr ago  (6/22/21) 2 yr ago  (4/29/21) 2 yr ago  (4/1/21)   Sodium Level 136 - 145 mmol/L 142  141  140  138  139  136  136    Potassium Level 3.5 - 5.1 mmol/L 3.9  3.7  3.7  3.6  3.9  3.4 Low   3.6    Chloride 98 - 107 mmol/L 104  101  102  105  101  103  99    Carbon Dioxide 22 - 29 mmol/L 29  30 High   27  25  27  24  27    Glucose Level 74 - 100 mg/dL 83  91  84  86  77  102 High   75    Blood Urea Nitrogen 7.0 - 18.7 mg/dL 9.3  7.6  7.4  5.9 Low   6.3 Low   6.3 Low   10.1    Creatinine 0.55 - 1.02 mg/dL 0.75  0.79  0.72  0.56   0.54 Low   0.59  0.71    Calcium Level Total 8.4 - 10.2 mg/dL 7.7 Low   8.4  8.0 Low   7.4 Low   8.5  8.1 Low   9.7    -     Renal Function Panel; Future; Expected date: 08/22/2023      Follow up in about 3 months (around 11/8/2023) for Thyroid cancer, Postsurgical Hypothyroidism, Postsurigcal Hypoparathyroidism Schedule w/ Dr Arnold .    I spent a total of 30 minutes on the day of the visit.  This includes face to face time and non-face to face time preparing to see the patient (eg, review of tests), obtaining and/or reviewing separately obtained history, documenting clinical information in the electronic or other health record, independently interpreting results and communicating results to the patient/family/caregiver, or care coordinator.

## 2023-08-18 ENCOUNTER — PATIENT MESSAGE (OUTPATIENT)
Dept: ENDOCRINOLOGY | Facility: CLINIC | Age: 33
End: 2023-08-18
Payer: MEDICAID

## 2023-09-11 ENCOUNTER — PATIENT MESSAGE (OUTPATIENT)
Dept: ENDOCRINOLOGY | Facility: CLINIC | Age: 33
End: 2023-09-11
Payer: MEDICAID

## 2023-09-19 ENCOUNTER — PATIENT MESSAGE (OUTPATIENT)
Dept: ENDOCRINOLOGY | Facility: CLINIC | Age: 33
End: 2023-09-19
Payer: MEDICAID

## 2023-09-21 DIAGNOSIS — E20.9 HYPOPARATHYROIDISM, UNSPECIFIED HYPOPARATHYROIDISM TYPE: ICD-10-CM

## 2023-09-21 DIAGNOSIS — E89.0 POSTSURGICAL HYPOTHYROIDISM: ICD-10-CM

## 2023-09-21 RX ORDER — SULFAMETHOXAZOLE AND TRIMETHOPRIM 800; 160 MG/1; MG/1
TABLET ORAL
Qty: 30 TABLET | Refills: 4 | Status: SHIPPED | OUTPATIENT
Start: 2023-09-21 | End: 2023-10-09

## 2023-09-21 RX ORDER — CALCITRIOL 0.25 UG/1
0.25 CAPSULE ORAL
Qty: 30 CAPSULE | Refills: 11 | Status: SHIPPED | OUTPATIENT
Start: 2023-09-21 | End: 2023-12-07 | Stop reason: SDUPTHER

## 2023-10-09 ENCOUNTER — LAB VISIT (OUTPATIENT)
Dept: LAB | Facility: HOSPITAL | Age: 33
End: 2023-10-09
Attending: NURSE PRACTITIONER
Payer: MEDICAID

## 2023-10-09 ENCOUNTER — PATIENT MESSAGE (OUTPATIENT)
Dept: ENDOCRINOLOGY | Facility: CLINIC | Age: 33
End: 2023-10-09
Payer: MEDICAID

## 2023-10-09 DIAGNOSIS — E89.2 POSTSURGICAL HYPOPARATHYROIDISM: ICD-10-CM

## 2023-10-09 DIAGNOSIS — Z85.850 HISTORY OF PAPILLARY ADENOCARCINOMA OF THYROID: ICD-10-CM

## 2023-10-09 DIAGNOSIS — E89.0 POSTSURGICAL HYPOTHYROIDISM: ICD-10-CM

## 2023-10-09 LAB
ALBUMIN SERPL-MCNC: 4.2 G/DL (ref 3.5–5)
BUN SERPL-MCNC: 10.6 MG/DL (ref 7–18.7)
CALCIUM SERPL-MCNC: 8.5 MG/DL (ref 8.4–10.2)
CHLORIDE SERPL-SCNC: 103 MMOL/L (ref 98–107)
CO2 SERPL-SCNC: 27 MMOL/L (ref 22–29)
CREAT SERPL-MCNC: 0.78 MG/DL (ref 0.55–1.02)
GFR SERPLBLD CREATININE-BSD FMLA CKD-EPI: >60 MLS/MIN/1.73/M2
GLUCOSE SERPL-MCNC: 83 MG/DL (ref 74–100)
PHOSPHATE SERPL-MCNC: 4.1 MG/DL (ref 2.3–4.7)
POTASSIUM SERPL-SCNC: 3.9 MMOL/L (ref 3.5–5.1)
SODIUM SERPL-SCNC: 139 MMOL/L (ref 136–145)
TSH SERPL-ACNC: 4.01 UIU/ML (ref 0.35–4.94)

## 2023-10-09 PROCEDURE — 86800 THYROGLOBULIN ANTIBODY: CPT

## 2023-10-09 PROCEDURE — 84443 ASSAY THYROID STIM HORMONE: CPT

## 2023-10-09 PROCEDURE — 36415 COLL VENOUS BLD VENIPUNCTURE: CPT

## 2023-10-09 PROCEDURE — 80069 RENAL FUNCTION PANEL: CPT

## 2023-10-09 RX ORDER — THYROID 90 MG/1
90 TABLET ORAL
Qty: 30 TABLET | Refills: 5 | Status: SHIPPED | OUTPATIENT
Start: 2023-10-09 | End: 2024-10-08

## 2023-10-10 DIAGNOSIS — Z85.850 HISTORY OF PAPILLARY ADENOCARCINOMA OF THYROID: Primary | ICD-10-CM

## 2023-10-10 LAB
ENDOCRINOLOGIST REVIEW: ABNORMAL
THYROGLOB AB SERPL-ACNC: <1.8 IU/ML
THYROGLOB SERPL-MCNC: 3.8 NG/ML

## 2023-10-17 DIAGNOSIS — Z85.850 HISTORY OF PAPILLARY ADENOCARCINOMA OF THYROID: Primary | ICD-10-CM

## 2023-10-20 ENCOUNTER — TELEPHONE (OUTPATIENT)
Dept: ENDOCRINOLOGY | Facility: CLINIC | Age: 33
End: 2023-10-20
Payer: MEDICAID

## 2023-10-20 NOTE — TELEPHONE ENCOUNTER
IR deferred FNA due to concern over safety of procedure.      Plan then remains to titrate LT4 and regroup via labs prior to f/u next month.

## 2023-11-17 ENCOUNTER — PATIENT MESSAGE (OUTPATIENT)
Dept: ENDOCRINOLOGY | Facility: CLINIC | Age: 33
End: 2023-11-17
Payer: MEDICAID

## 2023-11-17 ENCOUNTER — TELEPHONE (OUTPATIENT)
Dept: ENDOCRINOLOGY | Facility: CLINIC | Age: 33
End: 2023-11-17
Payer: MEDICAID

## 2023-11-17 DIAGNOSIS — E89.2 POST-SURGICAL HYPOPARATHYROIDISM: Primary | ICD-10-CM

## 2023-11-21 ENCOUNTER — LAB VISIT (OUTPATIENT)
Dept: LAB | Facility: HOSPITAL | Age: 33
End: 2023-11-21
Attending: NURSE PRACTITIONER
Payer: MEDICAID

## 2023-11-21 DIAGNOSIS — E89.0 POSTSURGICAL HYPOTHYROIDISM: ICD-10-CM

## 2023-11-21 DIAGNOSIS — E89.2 POST-SURGICAL HYPOPARATHYROIDISM: ICD-10-CM

## 2023-11-21 DIAGNOSIS — Z85.850 HISTORY OF PAPILLARY ADENOCARCINOMA OF THYROID: ICD-10-CM

## 2023-11-21 LAB
ALBUMIN SERPL-MCNC: 4 G/DL (ref 3.5–5)
BUN SERPL-MCNC: 9.6 MG/DL (ref 7–18.7)
CALCIUM SERPL-MCNC: 7.8 MG/DL (ref 8.4–10.2)
CHLORIDE SERPL-SCNC: 106 MMOL/L (ref 98–107)
CO2 SERPL-SCNC: 27 MMOL/L (ref 22–29)
CREAT SERPL-MCNC: 0.71 MG/DL (ref 0.55–1.02)
DEPRECATED CALCIDIOL+CALCIFEROL SERPL-MC: 41.6 NG/ML (ref 30–80)
GFR SERPLBLD CREATININE-BSD FMLA CKD-EPI: >60 MLS/MIN/1.73/M2
GLUCOSE SERPL-MCNC: 62 MG/DL (ref 74–100)
PHOSPHATE SERPL-MCNC: 3.9 MG/DL (ref 2.3–4.7)
POTASSIUM SERPL-SCNC: 3.5 MMOL/L (ref 3.5–5.1)
SODIUM SERPL-SCNC: 143 MMOL/L (ref 136–145)
T3FREE SERPL-MCNC: 4.65 PG/ML (ref 1.58–3.91)
T4 FREE SERPL-MCNC: 0.74 NG/DL (ref 0.7–1.48)
TSH SERPL-ACNC: 0.47 UIU/ML (ref 0.35–4.94)

## 2023-11-21 PROCEDURE — 82306 VITAMIN D 25 HYDROXY: CPT

## 2023-11-21 PROCEDURE — 84443 ASSAY THYROID STIM HORMONE: CPT

## 2023-11-21 PROCEDURE — 84439 ASSAY OF FREE THYROXINE: CPT

## 2023-11-21 PROCEDURE — 36415 COLL VENOUS BLD VENIPUNCTURE: CPT

## 2023-11-21 PROCEDURE — 86800 THYROGLOBULIN ANTIBODY: CPT

## 2023-11-21 PROCEDURE — 80069 RENAL FUNCTION PANEL: CPT

## 2023-11-21 PROCEDURE — 84481 FREE ASSAY (FT-3): CPT

## 2023-11-24 LAB
ENDOCRINOLOGIST REVIEW: ABNORMAL
THYROGLOB AB SERPL-ACNC: <1.8 IU/ML
THYROGLOB SERPL-MCNC: 1.4 NG/ML

## 2023-12-07 ENCOUNTER — OFFICE VISIT (OUTPATIENT)
Dept: ENDOCRINOLOGY | Facility: CLINIC | Age: 33
End: 2023-12-07
Payer: MEDICAID

## 2023-12-07 VITALS
WEIGHT: 150 LBS | HEIGHT: 69 IN | RESPIRATION RATE: 15 BRPM | BODY MASS INDEX: 22.22 KG/M2 | HEART RATE: 112 BPM | DIASTOLIC BLOOD PRESSURE: 58 MMHG | SYSTOLIC BLOOD PRESSURE: 109 MMHG | TEMPERATURE: 98 F

## 2023-12-07 DIAGNOSIS — C73 THYROID CANCER: Primary | ICD-10-CM

## 2023-12-07 DIAGNOSIS — E89.0 POSTSURGICAL HYPOTHYROIDISM: ICD-10-CM

## 2023-12-07 DIAGNOSIS — E89.2 POSTSURGICAL HYPOPARATHYROIDISM: ICD-10-CM

## 2023-12-07 PROCEDURE — 99213 OFFICE O/P EST LOW 20 MIN: CPT | Mod: PBBFAC

## 2023-12-07 RX ORDER — CALCITRIOL 0.25 UG/1
0.25 CAPSULE ORAL 2 TIMES DAILY
Qty: 60 CAPSULE | Refills: 11 | Status: SHIPPED | OUTPATIENT
Start: 2023-12-07

## 2023-12-07 NOTE — PROGRESS NOTES
Subjective     Patient ID: Raza Cerda is a 33 y.o. female.    Chief Complaint: Hypothyroidism    Patient is [id number]/30/2021 Endocrine Clinic Note: Ms. Cerda is a 31 y/o female with a PMH of stage I thyroid cancer s/p thyroidectomy 5/20 w/ comorbid postsurgical hypothyroidism and hypoparathyroidism. Patient has history of hypoparathyroidism post surgery. Today, patient states she is doing well, her only complaints are mild cold intolerance and some tingling of her hands and feet every 1-2 days, in the past she has complained of palpitations and tremors, however these are now resolved. She does not take extra calcium for symptoms, and states she experiences the tremors mainly before she takes her morning Calcitriol. She remains on Tirosint and is complaint with her medication. She is complaint with calcitriol and calcium carbonate. Her TSH remains suppressed post surgically and is in the acceptable range for this stage of post-op treatment. Thyroglobulin and thyroglobulin antibody were negative last visit in August. Repeat thyroglobulin and thyroglobulin antibody pending today. Patient has no other concerns or complaints today. Patient is to return to clinic in 6 months for continued monitoring. She will have labs and urine studies completed before this visit.  [1]      04/01/2021 Endocrine Clinic Note: 31 y/o female scheduled today for Endocrine F/U. with a PMH of stage I thyroid cancer s/p thyroidectomy 5/20 w/ comorbid postsurgical hypothyroidism and hypoparathyroidism.  Patient reports to clinic today recently finding out she is 5 weeks pregnant.  Patient states her hCG levels were drawn and matched her being 4 to 5 weeks and she states her periods have been regular and pregnancy dates are accurate. Postsurgical hypothyroidism TSH was 1.2750, free T4 0.87, T4 9.52, free T3 2.57 patient reports fatigue.  She denies any missed medication doses and states she takes her medication daily on an empty  stomach 30 minutes before any food or beverages with water only.  Hypoparathyroidism patient's current calcium level is 9.7, albumin 3.5 corrected calcium 10.1 phosphorus 3.9 patient is currently on calcitriol 25 mcg 1 tablet twice daily and also calcium carbonate 500 mg 1 tablet daily. She denies any tingling to hands or feet, muscle weakness or spasms.      Thyroid cancer US 03/30/2021 FINDINGS:  The thyroid gland has been removed. No abnormal soft tissue is seen in the bed of the resected thyroid gland. No abnormal lymph nodes are seen within the visualized portions of the neck. No other significant findings. IMPRESSION: 1. No significant abnormalities are identified. [1]      07/16/2021 Endocrine Clinic note: Mrs. Cerda is a 31 y.o. F with PMH Papillary thyroid carcinoma s/p Complete Thyroidectomy complicated by Post-surgical Hypothyroidism and Post-surgical Hypoparathyroidism, Pregnancy at 23 WGA, and Anxiety returning to the Endocrinology Clinic for Follow-up. She denies any headache, muscle weakness, muscle spasms, nausea, emesis, constipation, diarrhea, CP, dyspnea, cold intolerance, heat intolerance, nor headaches; + for fatigue attributable to current pregnancy. She also complains of intermittent parasthesia of R hand which is relieved by PRN calcium carbonate, currently not taking scheduling calcium carbonate.  She states she is feeling well today aside from being tired which she attributes to pregnancy. [2]      08/16/2021 Endocrine Clinic Note: 31 year old female scheduled today as Endocrine Clinic F/U. HX of Papillary thyroid carcinoma s/p Complete Thyroidectomy complicated by Post-surgical Hypothyroidism and Post-surgical Hypoparathyroidism, Pregnancy at 28 week today.  Current Calcium level 7.4,  Albumin 2.5 corrected calcium 8.6 , Phos 4.0. Pt reports occasional right hand numbness at night but only takes her calcium Citrate as needed when she had symptoms with numbness. She reports fatigue  currently but attributes it to pregnancy. She is currently on calcitriol 0.25 mg Once a day and calcium citrate once at night (only takes with numbness). Low albumin pt states her GYN is monitoring. Previous Vitamin D level 57.3 on 04/01/2021. [1]      10/08/2021 Telemedicine Visit Note-Endocrine Clinic: 32 y/o female scheduled today for endocrine clinic follow-up.  History of papillary thyroid carcinoma s/p complete thyroidectomy complicated by postsurgical hypoparathyroidism and hypothyroidism.  Patient is currently 36 weeks pregnant. Previous labs  Calcium level 7.4,  Albumin 2.5 corrected calcium 8.6 , Phos 4.0 on 08/12/2021 Hypothyroidism previous TSH 0.975 on 08/12/2021.  Patient had labs completed she says she reported to [facility] lab on Wednesday no labs noted in chart and orders canceled.  Lab was called states no specimen was received patient was instructed that we will need to repeat labs.  Patient reports some numbness when sitting on the toilet to her legs so she is unsure if it is due to low calcium levels are being 36 weeks pregnant.  She states that time when she is laying flat on her back she has numbness to her hands and legs.  Patient reports ability to sit but recently was able to have from the pharmacy and has been on generic levothyroxine.  She will be repeating her labs today.  Instructed patient we will ensure she is at goal for delivery.     01/27/2022 Endocrine Clinic Note: 31-year-old female scheduled today for endocrine clinic follow-up.  History of papillary thyroid carcinoma /p complete thyroidectomy complicated by postsurgical hypoparathyroidism and hypothyroidism.  Patient was previously pregnant and delivered on Nov 6th, 2021.  Papillary thyroid carcinoma/postsurgical hypothyroidism current TSH 0.018, free T4 1.63, T4 10.6.  Currently not at goal goal 0.1-0.5. Pt is currently on Tirosint 112 mcg  Postsurgical hypoparathyroidism patient is currently on calcitriol 0.25 mcg once a day and  calcium citrate at night when symptomatic. She denies any symptoms of tinging or numbness.  She states since delivery she has not had as many symptoms and has not had to take her nighttime calcium citrate often. Patient previously had thyroglobulin antibody which was  < 1.0 on 08/25/2020 and had increased to 2.3 on 10/26/2020 and was trending down on 04/29/2021 to 1.5.  Patient to repeat thyroglobulin levels today.     Telemedicine visit note 03/07/2023 endocrine clinic  I have reviewed the patient name, YOB: 1990  I have verified that this patient is in the state [facility]  NP location: [facility] endocrine clinic  Patient location:  Home  No other individuals present on call  Telemedicine visit utilized today  (audio/video)   Privacy discussed while using telemedicine grant     Start 14:25 Stopped 14:45     32-year-old female scheduled today for endocrine clinic follow-up.  History of papillary thyroid carcinoma with complete thyroidectomy with postsurgical hypothyroidism and hypoparathyroidism. Papillary thyroid carcinoma/ postsurgical hypothyroidism current TSH 0.203 currently at goal of 0.1-0.5 with history of thyroid cancer.  Patient previously was on Tirosint and was complaining of fatigue and was asking to be switched to [place] Thyroid currently on current thyroid.  Patient currently has fatigue but unable to assess why this due to her work schedule and having 2 children to her thyroid but has no complaints be on [place] thyroid today.  Currently patient's thyroglobulin panel is pending Patient previously had thyroglobulin antibody which was  < 1.0 on 08/25/2020 and had increased to 2.3 on 10/26/2020 and was trending down on 04/29/2021 to 1.5.  Postsurgical hypoparathyroidism patient is currently on calcitriol 0.25 mg once a day she denies any symptoms of hypocalcemia.  She denies any muscle tingling or cramps.    The patient location is: Home  The chief complaint leading to consultation is: HX  Papillary Thyroid carcinoma     Visit type: audiovisual    Face to Face time with patient: 15  30 minutes of total time spent on the encounter, which includes face to face time and non-face to face time preparing to see the patient (eg, review of tests), Obtaining and/or reviewing separately obtained history, Documenting clinical information in the electronic or other health record, Independently interpreting results (not separately reported) and communicating results to the patient/family/caregiver, or Care coordination (not separately reported).         Each patient to whom he or she provides medical services by telemedicine is:  (1) informed of the relationship between the physician and patient and the respective role of any other health care provider with respect to management of the patient; and (2) notified that he or she may decline to receive medical services by telemedicine and may withdraw from such care at any time.    Endocrine Clinic Note 08/08/2023: 33 -year-old female scheduled today for endocrine clinic follow-up.  History of papillary thyroid carcinoma with complete thyroidectomy with postsurgical hypothyroidism and hypoparathyroidism. Papillary thyroid carcinoma/ postsurgical hypothyroidism current TSH increased to 4.089 from previous  0.203.  Patient previously was on Tirosint and had complaints of fatigue and asked to be switched to [place] Thyroid pt is currently on [place] 75 mg a day.   patient's thyroglobulin panel  03/06/2023 Thyroglobulin Ab <1.8 Thyroglobulin tumor marker S 0.9. Patient previously had thyroglobulin antibody which was  < 1.0 on 08/25/2020 and had increased to 2.3 on 10/26/2020 and was trending down on 04/29/2021 to 1.5.  Levels trending down. Postsurgical hypoparathyroidism patient is currently on calcitriol 0.25 mg once a day she denies any symptoms of hypocalcemia. Current Calcium level decreased to 7.7 from previous 8.4.  She denies any muscle tingling or cramps.  Patient  reports she was recently she started taking an iron supplement and was taking 2-3 hours after taking her thyroid medication.  Patient will stop the iron supplement and repeat TSH in 6 weeks TSH previously at goal patient's current dose of medication.  She denies any weight gain.                       Review of Systems   Constitutional:  Negative for activity change, appetite change and fatigue.   HENT:  Negative for dental problem, hearing loss, tinnitus, trouble swallowing and goiter.    Eyes:  Negative for photophobia, pain and visual disturbance.   Respiratory:  Negative for cough, chest tightness and wheezing.    Cardiovascular:  Negative for chest pain, palpitations and leg swelling.   Gastrointestinal:  Negative for abdominal pain, constipation, diarrhea, nausea and reflux.   Endocrine: Negative for cold intolerance, heat intolerance, polydipsia and polyphagia.   Genitourinary:  Negative for difficulty urinating, flank pain, hematuria, hot flashes, menstrual irregularity, menstrual problem, nocturia and urgency.   Musculoskeletal:  Negative for back pain, gait problem, joint swelling, leg pain and joint deformity.   Integumentary:  Negative for color change, pallor, rash and breast discharge.   Allergic/Immunologic: Negative for environmental allergies, food allergies and immunocompromised state.   Neurological:  Negative for tremors, seizures, headaches, memory loss and coordination difficulties.   Psychiatric/Behavioral:  Negative for agitation, behavioral problems and sleep disturbance. The patient is not nervous/anxious.           Objective     Physical Exam  Constitutional:       Appearance: Normal appearance.   HENT:      Head: Normocephalic.      Nose: Nose normal.   Eyes:      Conjunctiva/sclera: Conjunctivae normal.   Pulmonary:      Effort: Pulmonary effort is normal.   Musculoskeletal:      Cervical back: Normal range of motion.   Neurological:      Mental Status: She is alert.   Psychiatric:          Mood and Affect: Mood normal.         Behavior: Behavior normal.         Thought Content: Thought content normal.         Judgment: Judgment normal.            Assessment and Plan     1. History of papillary adenocarcinoma of thyroid  TSH 0.203 at goal (0.1-0.5) on 08/07/2023  TSH 4.089 on 08/07/2023   On [place] thyroid 75 mg per day  Start iron supplement repeat TSH in 6 weeks  Return to clinic in 4 months Dr Arnold   Component Ref Range & Units 5 mo ago   Thyroglobulin Antibody, S <1.8 IU/mL <1.8    Thyroglobulin, Tumor Marker, S ng/mL 0.9 High       Component Ref Range & Units 1 d ago  (8/7/23) 5 mo ago  (3/6/23) 11 mo ago  (8/31/22) 1 yr ago  (7/8/22) 1 yr ago  (3/31/22) 1 yr ago  (8/12/21) 2 yr ago  (5/25/21)   Thyroid Stimulating Hormone 0.350 - 4.940 uIU/mL 4.089  0.203 Low   0.2419 Low  R  0.2709 Low  R  0.0761 R  0.9752 R  1.2182 R    -     TSH; Future; Expected date: 08/22/2023  -     Thyroglobulin; Future; Expected date: 08/22/2023    2. Postsurgical hypothyroidism  See Above   -     TSH; Future; Expected date: 08/22/2023      3. Postsurgical hypoparathyroidism  On calcitriol 0.25 mg per day (missed a few doses)   Asymptomatic  Calcium 7.7 on 08/07/2023 previous 8.4 on 08/31/2022  Use calcium carbonate over-the-counter once per day as needed   Component Ref Range & Units 1 d ago  (8/7/23) 11 mo ago  (8/31/22) 1 yr ago  (6/2/22) 1 yr ago  (8/12/21) 2 yr ago  (6/22/21) 2 yr ago  (4/29/21) 2 yr ago  (4/1/21)   Sodium Level 136 - 145 mmol/L 142  141  140  138  139  136  136    Potassium Level 3.5 - 5.1 mmol/L 3.9  3.7  3.7  3.6  3.9  3.4 Low   3.6    Chloride 98 - 107 mmol/L 104  101  102  105  101  103  99    Carbon Dioxide 22 - 29 mmol/L 29  30 High   27  25  27  24  27    Glucose Level 74 - 100 mg/dL 83  91  84  86  77  102 High   75    Blood Urea Nitrogen 7.0 - 18.7 mg/dL 9.3  7.6  7.4  5.9 Low   6.3 Low   6.3 Low   10.1    Creatinine 0.55 - 1.02 mg/dL 0.75  0.79  0.72  0.56  0.54 Low   0.59  0.71     Calcium Level Total 8.4 - 10.2 mg/dL 7.7 Low   8.4  8.0 Low   7.4 Low   8.5  8.1 Low   9.7    -     Renal Function Panel; Future; Expected date: 08/22/2023      No follow-ups on file.    No LOS data to display  This includes face to face time and non-face to face time preparing to see the patient (eg, review of tests), obtaining and/or reviewing separately obtained history, documenting clinical information in the electronic or other health record, independently interpreting results and communicating results to the patient/family/caregiver, or care coordinator.

## 2023-12-07 NOTE — PROGRESS NOTES
[facility] Endocrine  OUTPATIENT OFFICE VISIT NOTE    SUBJECTIVE:      HPI: Raza Cerda is a 33 y.o. yo female w/ PMH of Stage-I Micropapillary Thyroid Carcinoma (s/p total thyroidectomy, 5/2020) w/ post-surgical hypothyroidism & hypoparathyroidism who presents to the endocrine clinic for follow-up. Patient last seen 8/2023.  At that time; [place] thyroid was increased from 75 mg to 90 mg q.d..  Calcitriol was continued at 0.25 mg q.d. as that time patient denied any muscle tingling or cramps and serum calcium range between 7.7-8.4.    Today; patient reports feeling well overall since last office visit other than experiencing intermittent B/L upper extremity and/or B/L lower extremity numbness/tingling associated paresthesias and intermittent paralysis like feelings.  Patient states when this occurs she takes 1000 mg OTC calcium supplementation with nearly complete resolution of symptoms in approximately x5 minutes.    She reports close compliance with prescribed armor thyroid 90 mg q.d. and calcitriol 0.25 mg q.d. without missing any recent doses.  She denies any associated overt anxiety, tachycardia, palpitations, diaphoresis, heat intolerance, or diarrhea.  She also denies any seizure-like activity.    In regards to her thyroid cancer; patient underwent ultrasound surveillance (10/16/2023) which revealed 2 small foci thigh of homogeneous tissue within thyroidectomy bed that were not definitively seen on prior exam (02/2023).  FNA was ordered for further evaluation however biopsy was unable to be completed secondary to close proximity to underlying vasculature.  Over this timeframe, patient denies any noticeable swellings to the thyroid region, regional adenopathy, unexpected weight loss, night sweats, fever/chills, or appetite changes/early satiety.    ROS:  10 point ROS performed negative other than stated above       OBJECTIVE:     Vital signs:   BP (!) 109/58 (BP Location: Right arm, Patient Position:  "Sitting, BP Method: Medium (Automatic))   Pulse (!) 112   Temp 98 °F (36.7 °C)   Resp 15   Ht 5' 9" (1.753 m)   Wt 68 kg (150 lb)   LMP 11/30/2023   BMI 22.15 kg/m²      Physical Examination:  General:  Well-nourished, well-developed female, no  HEENT: PERRL; nasal and oral mucosa moist and clear; anterior central post thyroidectomy scar, well healed, without any appreciable scar tissue.  No regional adenopathy appreciated  Pulm: CTA bilaterally, normal work of breathing  CV: S1, S2 w/o murmurs or gallops; no edema noted  GI: Soft with normal bowel sounds, nontender to follow up outpatient  Neuro: AAOx4; CN II-XII grossly intact  Psych: Cooperative; appropriate mood and affect     ASSESSMENT & PLAN:   1) Micropapillary Thyroid Carcinoma        -- s/p total thyroidectomy (05/2020)  2) Hypothyroidism        -- 2/2 to #1  3) Hypoparathyroidism        -- 2/2 to #1  - TSH trend (0.20-> 4.08-> 4.00-> 0.47)  - thyroglobulin antibody trend (serially <1.8)  - thyroglobulin tumor marker trend (0.9->3.8-> 1.4)  - [place] thyroid increased at previous office visit from 75 mg q.d. to 90 mg q.d.  - current calcitriol regimen 0.25 mg q.d. with 1000 mg OTC calcium supplementation p.r.n. for hypocalcemic like symptoms  - recommend up titrating calcitriol to 0.25 mg b.i.d. at this time  - we will obtain repeat Chem panel in times 1-2 weeks in setting of calcitriol uptitration  - recent ultrasound thyroid (10/16/2023) with suspicion for new x2 small foci of tissue within B/L thyroid resection bed region not definitively seen on prior exam.  Attempts at FNA scheduling were made though discontinued secondary to close vicinity to underlying vasculature  - discussed in great detail above findings with patient and due to her low risk for recurrence; we will continue with imaging and serological surveillance    Health Maintenance:  Immunization History   Administered Date(s) Administered    DTP 1990, 1990, 1990, " 11/10/1992    DTaP 10/02/1997    HPV 9-Valent 02/15/2007, 04/17/2007, 10/09/2008    HPV Quadrivalent 02/15/2007, 04/17/2007, 10/09/2008    Hepatitis B 10/20/1997, 11/19/1997, 05/12/1998    Influenza - Quadrivalent - PF *Preferred* (6 months and older) 09/22/2015    Influenza - Trivalent - PF (ADULT) 09/22/2015    Influenza A (H1N1) 2009 Monovalent - Intranasal 11/19/2009    MMR 06/08/1992, 08/04/1999, 10/12/1999    Meningococcal Conjugate (MCV4P) 02/16/2006    OPV 08/04/1999, 10/12/1999, 06/14/2000    PPD Test 08/02/1999    Tdap 12/06/2015     Return to clinic in 4-6 months.      Gavino Momin MD  Internal Medicine, PGY-III

## 2023-12-20 ENCOUNTER — TELEPHONE (OUTPATIENT)
Dept: ENDOCRINOLOGY | Facility: CLINIC | Age: 33
End: 2023-12-20
Payer: MEDICAID

## 2023-12-20 NOTE — TELEPHONE ENCOUNTER
Attempted to call pt to informed her that her disability forms were faxed to [facility] at [phone]. No answer left voicemail.

## 2023-12-20 NOTE — TELEPHONE ENCOUNTER
Spoke with pt informed her that disability forms were faxed and original forms will be mail to her home address.

## 2023-12-28 ENCOUNTER — TELEPHONE (OUTPATIENT)
Dept: ENDOCRINOLOGY | Facility: CLINIC | Age: 33
End: 2023-12-28
Payer: MEDICAID

## 2023-12-28 NOTE — TELEPHONE ENCOUNTER
[employer] is requesting updated ernesto reports and detailed office visit notes for the patient's medical request to be off work through 01/31/24.     Please advise.

## 2024-01-02 NOTE — TELEPHONE ENCOUNTER
Per Dr. Arnold's previous message, there are no additional records to send to support pt's claim. Last visit note was already faxed.

## 2024-01-25 ENCOUNTER — PATIENT MESSAGE (OUTPATIENT)
Dept: ENDOCRINOLOGY | Facility: CLINIC | Age: 34
End: 2024-01-25
Payer: MEDICAID

## 2024-01-31 ENCOUNTER — PATIENT MESSAGE (OUTPATIENT)
Dept: ADMINISTRATIVE | Facility: HOSPITAL | Age: 34
End: 2024-01-31
Payer: MEDICAID

## 2024-01-31 ENCOUNTER — PATIENT MESSAGE (OUTPATIENT)
Dept: ENDOCRINOLOGY | Facility: CLINIC | Age: 34
End: 2024-01-31
Payer: MEDICAID

## 2024-01-31 NOTE — LETTER
February 1, 2024    Raza Cerda  [address]  [facility] [id number]         [facility]  [address] [place] [address] February 1, 2024     Patient: Raza Cerda   YOB: 1990         To Whom It May Concern:    It is my medical opinion that Raza Cerda may return to work on 02/01/2024 .    If you have any questions or concerns, please don't hesitate to call.    Sincerely,        Ottoniel Arnold MD

## 2024-01-31 NOTE — TELEPHONE ENCOUNTER
Pt is asking for a return note (Her disability ) she is supposed to be back on  but states has the flu and needs to go back on . Would this note need to be sign by her PCP/Urgent care or whoever diagnosed she has the flu?

## 2024-02-01 NOTE — TELEPHONE ENCOUNTER
Spoke with pt and reminded to complete pending Renal function panel at her earliest convenience, pt states will try to complete in a few days.

## 2024-02-02 ENCOUNTER — TELEPHONE (OUTPATIENT)
Dept: ENDOCRINOLOGY | Facility: CLINIC | Age: 34
End: 2024-02-02
Payer: MEDICAID

## 2024-02-02 ENCOUNTER — LAB VISIT (OUTPATIENT)
Dept: LAB | Facility: HOSPITAL | Age: 34
End: 2024-02-02
Attending: INTERNAL MEDICINE
Payer: MEDICAID

## 2024-02-02 DIAGNOSIS — E89.2 POSTSURGICAL HYPOPARATHYROIDISM: ICD-10-CM

## 2024-02-02 DIAGNOSIS — C73 THYROID CANCER: ICD-10-CM

## 2024-02-02 DIAGNOSIS — E89.2 POST-SURGICAL HYPOPARATHYROIDISM: Primary | ICD-10-CM

## 2024-02-02 DIAGNOSIS — E89.0 POSTSURGICAL HYPOTHYROIDISM: ICD-10-CM

## 2024-02-02 LAB
ALBUMIN SERPL-MCNC: 4 G/DL (ref 3.5–5)
BUN SERPL-MCNC: 8.5 MG/DL (ref 7–18.7)
CALCIUM SERPL-MCNC: 7.8 MG/DL (ref 8.4–10.2)
CHLORIDE SERPL-SCNC: 106 MMOL/L (ref 98–107)
CO2 SERPL-SCNC: 27 MMOL/L (ref 22–29)
CREAT SERPL-MCNC: 0.78 MG/DL (ref 0.55–1.02)
GFR SERPLBLD CREATININE-BSD FMLA CKD-EPI: >60 MLS/MIN/1.73/M2
GLUCOSE SERPL-MCNC: 71 MG/DL (ref 74–100)
PHOSPHATE SERPL-MCNC: 4.1 MG/DL (ref 2.3–4.7)
POTASSIUM SERPL-SCNC: 3.5 MMOL/L (ref 3.5–5.1)
SODIUM SERPL-SCNC: 141 MMOL/L (ref 136–145)

## 2024-02-02 PROCEDURE — 36415 COLL VENOUS BLD VENIPUNCTURE: CPT

## 2024-02-02 PROCEDURE — 80069 RENAL FUNCTION PANEL: CPT

## 2024-02-02 NOTE — TELEPHONE ENCOUNTER
On BID calcitriol and prn calcium, pt's calcium remains not at goal.    Recommend continue BID calcitriol and further schedule 1000mg of tums BIDAC meals.  Plan for repeat renal panel next week.

## 2024-02-06 NOTE — TELEPHONE ENCOUNTER
Rama, if I remember correctly, we discussed and I don't have documentation to support the claims/justify completing the forms?

## 2024-05-24 ENCOUNTER — PATIENT MESSAGE (OUTPATIENT)
Dept: ENDOCRINOLOGY | Facility: CLINIC | Age: 34
End: 2024-05-24
Payer: MEDICAID

## 2024-05-24 ENCOUNTER — LAB VISIT (OUTPATIENT)
Dept: LAB | Facility: HOSPITAL | Age: 34
End: 2024-05-24
Attending: INTERNAL MEDICINE
Payer: MEDICAID

## 2024-05-24 DIAGNOSIS — E89.2 POST-SURGICAL HYPOPARATHYROIDISM: ICD-10-CM

## 2024-05-24 DIAGNOSIS — C73 THYROID CANCER: Primary | ICD-10-CM

## 2024-05-24 DIAGNOSIS — C73 THYROID CANCER: ICD-10-CM

## 2024-05-24 LAB
ALBUMIN SERPL-MCNC: 3.9 G/DL (ref 3.5–5)
BUN SERPL-MCNC: 8.6 MG/DL (ref 7–18.7)
CALCIUM SERPL-MCNC: 8.1 MG/DL (ref 8.4–10.2)
CHLORIDE SERPL-SCNC: 106 MMOL/L (ref 98–107)
CO2 SERPL-SCNC: 26 MMOL/L (ref 22–29)
CREAT SERPL-MCNC: 0.73 MG/DL (ref 0.55–1.02)
GFR SERPLBLD CREATININE-BSD FMLA CKD-EPI: >60 ML/MIN/1.73/M2
GLUCOSE SERPL-MCNC: 92 MG/DL (ref 74–100)
PHOSPHATE SERPL-MCNC: 3.1 MG/DL (ref 2.3–4.7)
POTASSIUM SERPL-SCNC: 4 MMOL/L (ref 3.5–5.1)
SODIUM SERPL-SCNC: 141 MMOL/L (ref 136–145)
T3FREE SERPL-MCNC: 4.88 PG/ML (ref 1.58–3.91)
T4 FREE SERPL-MCNC: 0.86 NG/DL (ref 0.7–1.48)
TSH SERPL-ACNC: 0.36 UIU/ML (ref 0.35–4.94)

## 2024-05-24 PROCEDURE — 84481 FREE ASSAY (FT-3): CPT

## 2024-05-24 PROCEDURE — 84443 ASSAY THYROID STIM HORMONE: CPT

## 2024-05-24 PROCEDURE — 36415 COLL VENOUS BLD VENIPUNCTURE: CPT

## 2024-05-24 PROCEDURE — 84439 ASSAY OF FREE THYROXINE: CPT

## 2024-05-24 PROCEDURE — 80069 RENAL FUNCTION PANEL: CPT

## 2024-05-24 NOTE — TELEPHONE ENCOUNTER
I spoke with pt who is having the following symptoms: fatigue, insomnia, weight gain, increased anxiety    Currently taking NP thyroid 90mcg

## 2024-05-24 NOTE — TELEPHONE ENCOUNTER
Await results for next steps.    The orders appear to be under Mrs. Patten.  Please clarify if this needs to be fixed.

## 2024-05-24 NOTE — TELEPHONE ENCOUNTER
NP thyroid and armourthyroid both are animal derived forms of thyroid hormone replacement.      Recommend adding on TSH and if possible a FT4/FT3 to labs drawn today and will sort next steps accordingly.

## 2024-05-28 NOTE — TELEPHONE ENCOUNTER
"The labs show her parathyroid regimen is working.    They also show her TSH is at goal for her cancer.    Regarding her request to change the thyroid "brand", her chart and levels show that likely will not help as they argue all of her symptoms may not all be due to her thyroid.    We can change to dual therapy to allow us to reduce the component of T3 to see if it helps with her insomnia and anxiety.    If she is agreeable, would stop her NP thyroid and erx LT4 112mcg po daily plus LT3 two 5mcg tabs po daily, 90 days supply of both and plan for repeat labs prior to f/u including TSH, FT4, Ft3, renal panel, thyroglobulin panel.    If she is not agreeable, then next step is to further discuss this at our next visit while continuing the current regimen with the same labs prior.  "

## 2024-05-28 NOTE — TELEPHONE ENCOUNTER
"I called pt and discussed the following with pt:     The labs show her parathyroid regimen is working.     They also show her TSH is at goal for her cancer.     Regarding her request to change the thyroid "brand", her chart and levels show that likely will not help as they argue all of her symptoms may not all be due to her thyroid.     We can change to dual therapy to allow us to reduce the component of T3 to see if it helps with her insomnia and anxiety.     If she is agreeable, would stop her NP thyroid and erx LT4 112mcg po daily plus LT3 two 5mcg tabs po daily, 90 days supply of both and plan for repeat labs prior to f/u including TSH, FT4, Ft3, renal panel, thyroglobulin panel.     If she is not agreeable, then next step is to further discuss this at our next visit while continuing the current regimen with the same labs prior.    Pt verbalizes understanding and stated she will remain on current regimen and will discuss at visit in July 2024  "

## 2024-07-25 ENCOUNTER — OFFICE VISIT (OUTPATIENT)
Dept: ENDOCRINOLOGY | Facility: CLINIC | Age: 34
End: 2024-07-25
Payer: MEDICAID

## 2024-07-25 VITALS
SYSTOLIC BLOOD PRESSURE: 106 MMHG | DIASTOLIC BLOOD PRESSURE: 67 MMHG | RESPIRATION RATE: 16 BRPM | HEIGHT: 69 IN | BODY MASS INDEX: 22.63 KG/M2 | HEART RATE: 87 BPM | TEMPERATURE: 98 F | WEIGHT: 152.75 LBS

## 2024-07-25 DIAGNOSIS — C73 THYROID CANCER: Primary | ICD-10-CM

## 2024-07-25 DIAGNOSIS — E89.2 POSTSURGICAL HYPOPARATHYROIDISM: ICD-10-CM

## 2024-07-25 DIAGNOSIS — E89.0 POSTSURGICAL HYPOTHYROIDISM: ICD-10-CM

## 2024-07-25 PROCEDURE — 99214 OFFICE O/P EST MOD 30 MIN: CPT | Mod: PBBFAC

## 2024-07-25 NOTE — PROGRESS NOTES
[facility] Endocrine  OUTPATIENT OFFICE VISIT NOTE    SUBJECTIVE:      HPI: Raza Cerda is a 34 y.o. yo female w/ PMH of Stage-I Micropapillary Thyroid Carcinoma (s/p total thyroidectomy, 5/2020) w/ post-surgical hypothyroidism & hypoparathyroidism who presents to the endocrine clinic for follow-up. Patient last seen 8/2023.  At that time; [place] thyroid was increased from 75 mg to 90 mg q.d..  Calcitriol was continued at 0.25 mg q.d. as that time patient denied any muscle tingling or cramps and serum calcium range between 7.7-8.4.    Today; patient reports feeling well overall since last office visit other than experiencing intermittent B/L upper extremity and/or B/L lower extremity numbness/tingling associated paresthesias and intermittent paralysis like feelings.  Patient states when this occurs she takes 1000 mg OTC calcium supplementation with nearly complete resolution of symptoms in approximately x5 minutes.    She reports close compliance with prescribed armor thyroid 90 mg q.d. and calcitriol 0.25 mg q.d. without missing any recent doses.  She denies any associated overt anxiety, tachycardia, palpitations, diaphoresis, heat intolerance, or diarrhea.  She also denies any seizure-like activity.    In regards to her thyroid cancer; patient underwent ultrasound surveillance (10/16/2023) which revealed 2 small foci thigh of homogeneous tissue within thyroidectomy bed that were not definitively seen on prior exam (02/2023).  FNA was ordered for further evaluation however biopsy was unable to be completed secondary to close proximity to underlying vasculature.  Over this timeframe, patient denies any noticeable swellings to the thyroid region, regional adenopathy, unexpected weight loss, night sweats, fever/chills, or appetite changes/early satiety.    ROS:  10 point ROS performed negative other than stated above       OBJECTIVE:     Vital signs:   There were no vitals taken for this visit.     Physical  Examination:  General:  Well-nourished, well-developed female, no  HEENT: PERRL; nasal and oral mucosa moist and clear; anterior central post thyroidectomy scar, well healed, without any appreciable scar tissue.  No regional adenopathy appreciated  Pulm: CTA bilaterally, normal work of breathing  CV: S1, S2 w/o murmurs or gallops; no edema noted  GI: Soft with normal bowel sounds, nontender to follow up outpatient  Neuro: AAOx4; CN II-XII grossly intact  Psych: Cooperative; appropriate mood and affect     ASSESSMENT & PLAN:   1) Micropapillary Thyroid Carcinoma        -- s/p total thyroidectomy (05/2020)  2) Hypothyroidism        -- 2/2 to #1  3) Hypoparathyroidism        -- 2/2 to #1  - TSH trend (0.20-> 4.08-> 4.00-> 0.47)  - thyroglobulin antibody trend (serially <1.8)  - thyroglobulin tumor marker trend (0.9->3.8-> 1.4)  - [place] thyroid increased at previous office visit from 75 mg q.d. to 90 mg q.d.  - current calcitriol regimen 0.25 mg q.d. with 1000 mg OTC calcium supplementation p.r.n. for hypocalcemic like symptoms  - recommend up titrating calcitriol to 0.25 mg b.i.d. at this time  - we will obtain repeat Chem panel in times 1-2 weeks in setting of calcitriol uptitration  - recent ultrasound thyroid (10/16/2023) with suspicion for new x2 small foci of tissue within B/L thyroid resection bed region not definitively seen on prior exam.  Attempts at FNA scheduling were made though discontinued secondary to close vicinity to underlying vasculature  - discussed in great detail above findings with patient and due to her low risk for recurrence; we will continue with imaging and serological surveillance    Health Maintenance:  Immunization History   Administered Date(s) Administered    DTP 1991, 1991, 1991, 11/10/1992    DTaP 10/02/1997    HPV 9-Valent 02/15/2007, 04/17/2007, 10/09/2008    HPV Quadrivalent 02/15/2007, 04/17/2007, 10/09/2008    Hepatitis B 10/20/1997, 11/19/1997, 05/12/1998     Influenza - Quadrivalent - PF *Preferred* (6 months and older) 09/22/2015    Influenza - Trivalent - PF (ADULT) 09/22/2015    Influenza A (H1N1) 2009 Monovalent - Intranasal 11/19/2009    MMR 06/08/1992, 08/04/1999, 10/12/1999    Meningococcal Conjugate (MCV4P) 02/16/2006    OPV 08/04/1999, 10/12/1999, 06/14/2000    PPD Test 08/02/1999    Tdap 12/06/2015     Return to clinic in 4-6 months.      Maurilio Panchal, DO  [facility] Internal Medicine PGY-2

## 2024-08-26 DIAGNOSIS — E89.0 POSTSURGICAL HYPOTHYROIDISM: ICD-10-CM

## 2024-08-26 DIAGNOSIS — Z85.850 HISTORY OF PAPILLARY ADENOCARCINOMA OF THYROID: ICD-10-CM

## 2024-08-26 RX ORDER — LEVOTHYROXINE, LIOTHYRONINE 57; 13.5 UG/1; UG/1
90 TABLET ORAL
Qty: 30 TABLET | Refills: 3 | Status: SHIPPED | OUTPATIENT
Start: 2024-08-26

## 2024-09-20 DIAGNOSIS — Z85.850 HISTORY OF PAPILLARY ADENOCARCINOMA OF THYROID: ICD-10-CM

## 2024-09-20 DIAGNOSIS — E89.0 POSTSURGICAL HYPOTHYROIDISM: Primary | ICD-10-CM

## 2024-09-20 RX ORDER — LEVOTHYROXINE SODIUM 112 UG/1
112 TABLET ORAL
Qty: 30 TABLET | Refills: 3 | Status: SHIPPED | OUTPATIENT
Start: 2024-09-20

## 2024-09-20 RX ORDER — LIOTHYRONINE SODIUM 5 UG/1
TABLET ORAL
Qty: 60 TABLET | Refills: 3 | Status: SHIPPED | OUTPATIENT
Start: 2024-09-20

## 2024-09-20 NOTE — TELEPHONE ENCOUNTER
----- Message from Seda Sun sent at 9/20/2024  8:09 AM CDT -----  Pt of [place]        Pt called stating she's been waiting on a call from the nurse about a medication.    Pt requesting a call back.    Pt number [phone]      Please advise

## 2024-09-20 NOTE — TELEPHONE ENCOUNTER
I called and spoke with pt who stated she would like to try a T3 as discussed per last visit. Pt c/o fatigue. Pt also request brand only medication.

## 2024-09-20 NOTE — TELEPHONE ENCOUNTER
Spoke with patient informed Dr. Arnold will change the npthyroid to Synthroid 112mcg po daily along with cytomel 10mcg (2 five mcg tablets) daily.  Plan is to have labs and ultrasound done prior to next visit.   Voice her understanding.

## 2024-09-20 NOTE — TELEPHONE ENCOUNTER
Change npthyroid to synthroid 112mcg po daily, #30, 3 refills along with cytomel 5mcg tabs, 2 tabs po daily, #60, 3 refills.    Continue w/ plan for labs and u/s prior to f/u.

## 2024-10-22 ENCOUNTER — HOSPITAL ENCOUNTER (OUTPATIENT)
Dept: RADIOLOGY | Facility: HOSPITAL | Age: 34
Discharge: HOME | End: 2024-10-22
Attending: INTERNAL MEDICINE
Payer: MEDICAID

## 2024-10-22 DIAGNOSIS — E89.0 POSTSURGICAL HYPOTHYROIDISM: ICD-10-CM

## 2024-10-22 DIAGNOSIS — C73 THYROID CANCER: ICD-10-CM

## 2024-10-22 DIAGNOSIS — E89.2 POSTSURGICAL HYPOPARATHYROIDISM: ICD-10-CM

## 2024-10-22 PROCEDURE — 76536 US EXAM OF HEAD AND NECK: CPT | Mod: TC

## 2024-12-03 ENCOUNTER — APPOINTMENT (OUTPATIENT)
Dept: LAB | Facility: HOSPITAL | Age: 34
End: 2024-12-03
Attending: STUDENT IN AN ORGANIZED HEALTH CARE EDUCATION/TRAINING PROGRAM
Payer: MEDICAID

## 2024-12-03 ENCOUNTER — LAB VISIT (OUTPATIENT)
Dept: LAB | Facility: HOSPITAL | Age: 34
End: 2024-12-03
Attending: INTERNAL MEDICINE
Payer: MEDICAID

## 2024-12-03 ENCOUNTER — OFFICE VISIT (OUTPATIENT)
Dept: ENDOCRINOLOGY | Facility: CLINIC | Age: 34
End: 2024-12-03
Payer: MEDICAID

## 2024-12-03 VITALS
HEIGHT: 69 IN | WEIGHT: 148.81 LBS | BODY MASS INDEX: 22.04 KG/M2 | SYSTOLIC BLOOD PRESSURE: 105 MMHG | HEART RATE: 91 BPM | TEMPERATURE: 98 F | DIASTOLIC BLOOD PRESSURE: 61 MMHG | RESPIRATION RATE: 12 BRPM

## 2024-12-03 DIAGNOSIS — C73 THYROID CANCER: ICD-10-CM

## 2024-12-03 DIAGNOSIS — C73 PAPILLARY CARCINOMA OF THYROID: ICD-10-CM

## 2024-12-03 DIAGNOSIS — E89.2 POSTSURGICAL HYPOPARATHYROIDISM: ICD-10-CM

## 2024-12-03 DIAGNOSIS — E89.0 POSTSURGICAL HYPOTHYROIDISM: ICD-10-CM

## 2024-12-03 DIAGNOSIS — D51.9 ANEMIA DUE TO VITAMIN B12 DEFICIENCY, UNSPECIFIED B12 DEFICIENCY TYPE: Primary | ICD-10-CM

## 2024-12-03 DIAGNOSIS — R35.0 URINARY FREQUENCY: Primary | ICD-10-CM

## 2024-12-03 DIAGNOSIS — C73 THYROID CANCER: Primary | ICD-10-CM

## 2024-12-03 LAB
ALBUMIN SERPL-MCNC: 4.1 G/DL (ref 3.5–5)
ALBUMIN SERPL-MCNC: 4.2 G/DL (ref 3.5–5)
ALBUMIN/GLOB SERPL: 1.3 RATIO (ref 1.1–2)
ALP SERPL-CCNC: 57 UNIT/L (ref 40–150)
ALT SERPL-CCNC: 16 UNIT/L (ref 0–55)
ANION GAP SERPL CALC-SCNC: 8 MEQ/L
AST SERPL-CCNC: 17 UNIT/L (ref 5–34)
BACTERIA #/AREA URNS AUTO: ABNORMAL /HPF
BASOPHILS # BLD AUTO: 0.04 X10(3)/MCL
BASOPHILS NFR BLD AUTO: 0.5 %
BILIRUB SERPL-MCNC: 0.5 MG/DL
BILIRUB UR QL STRIP.AUTO: NEGATIVE
BUN SERPL-MCNC: 9 MG/DL (ref 7–18.7)
BUN SERPL-MCNC: 9.1 MG/DL (ref 7–18.7)
CALCIUM SERPL-MCNC: 8.1 MG/DL (ref 8.4–10.2)
CALCIUM SERPL-MCNC: 8.1 MG/DL (ref 8.4–10.2)
CHLORIDE SERPL-SCNC: 103 MMOL/L (ref 98–107)
CHLORIDE SERPL-SCNC: 104 MMOL/L (ref 98–107)
CLARITY UR: CLEAR
CO2 SERPL-SCNC: 26 MMOL/L (ref 22–29)
CO2 SERPL-SCNC: 27 MMOL/L (ref 22–29)
COLOR UR AUTO: ABNORMAL
CREAT SERPL-MCNC: 0.76 MG/DL (ref 0.55–1.02)
CREAT SERPL-MCNC: 0.77 MG/DL (ref 0.55–1.02)
CREAT/UREA NIT SERPL: 12
EOSINOPHIL # BLD AUTO: 0.11 X10(3)/MCL (ref 0–0.9)
EOSINOPHIL NFR BLD AUTO: 1.5 %
ERYTHROCYTE [DISTWIDTH] IN BLOOD BY AUTOMATED COUNT: 12.7 % (ref 11.5–17)
GFR SERPLBLD CREATININE-BSD FMLA CKD-EPI: >60 ML/MIN/1.73/M2
GFR SERPLBLD CREATININE-BSD FMLA CKD-EPI: >60 ML/MIN/1.73/M2
GLOBULIN SER-MCNC: 3.2 GM/DL (ref 2.4–3.5)
GLUCOSE SERPL-MCNC: 97 MG/DL (ref 74–100)
GLUCOSE SERPL-MCNC: 97 MG/DL (ref 74–100)
GLUCOSE UR QL STRIP: NORMAL
HCT VFR BLD AUTO: 40.4 % (ref 37–47)
HGB BLD-MCNC: 14 G/DL (ref 12–16)
HGB UR QL STRIP: NEGATIVE
IMM GRANULOCYTES # BLD AUTO: 0.02 X10(3)/MCL (ref 0–0.04)
IMM GRANULOCYTES NFR BLD AUTO: 0.3 %
KETONES UR QL STRIP: NEGATIVE
LEUKOCYTE ESTERASE UR QL STRIP: NEGATIVE
LYMPHOCYTES # BLD AUTO: 1.85 X10(3)/MCL (ref 0.6–4.6)
LYMPHOCYTES NFR BLD AUTO: 25.4 %
MCH RBC QN AUTO: 32.9 PG (ref 27–31)
MCHC RBC AUTO-ENTMCNC: 34.7 G/DL (ref 33–36)
MCV RBC AUTO: 95.1 FL (ref 80–94)
MONOCYTES # BLD AUTO: 0.55 X10(3)/MCL (ref 0.1–1.3)
MONOCYTES NFR BLD AUTO: 7.5 %
MUCOUS THREADS URNS QL MICRO: ABNORMAL /LPF
NEUTROPHILS # BLD AUTO: 4.72 X10(3)/MCL (ref 2.1–9.2)
NEUTROPHILS NFR BLD AUTO: 64.8 %
NITRITE UR QL STRIP: NEGATIVE
NRBC BLD AUTO-RTO: 0 %
PH UR STRIP: 6 [PH]
PHOSPHATE SERPL-MCNC: 4 MG/DL (ref 2.3–4.7)
PLATELET # BLD AUTO: 259 X10(3)/MCL (ref 130–400)
PMV BLD AUTO: 10.3 FL (ref 7.4–10.4)
POTASSIUM SERPL-SCNC: 3.8 MMOL/L (ref 3.5–5.1)
POTASSIUM SERPL-SCNC: 3.9 MMOL/L (ref 3.5–5.1)
PROT SERPL-MCNC: 7.4 GM/DL (ref 6.4–8.3)
PROT UR QL STRIP: NEGATIVE
RBC # BLD AUTO: 4.25 X10(6)/MCL (ref 4.2–5.4)
RBC #/AREA URNS AUTO: ABNORMAL /HPF
SODIUM SERPL-SCNC: 138 MMOL/L (ref 136–145)
SODIUM SERPL-SCNC: 139 MMOL/L (ref 136–145)
SP GR UR STRIP.AUTO: 1.02 (ref 1–1.03)
SQUAMOUS #/AREA URNS LPF: ABNORMAL /HPF
T3FREE SERPL-MCNC: 3.84 PG/ML (ref 1.58–3.91)
T4 FREE SERPL-MCNC: 0.95 NG/DL (ref 0.7–1.48)
TSH SERPL-ACNC: 0.09 UIU/ML (ref 0.35–4.94)
UROBILINOGEN UR STRIP-ACNC: NORMAL
WBC # BLD AUTO: 7.29 X10(3)/MCL (ref 4.5–11.5)
WBC #/AREA URNS AUTO: ABNORMAL /HPF

## 2024-12-03 PROCEDURE — 84481 FREE ASSAY (FT-3): CPT

## 2024-12-03 PROCEDURE — 36415 COLL VENOUS BLD VENIPUNCTURE: CPT

## 2024-12-03 PROCEDURE — 80053 COMPREHEN METABOLIC PANEL: CPT

## 2024-12-03 PROCEDURE — 84443 ASSAY THYROID STIM HORMONE: CPT

## 2024-12-03 PROCEDURE — 99213 OFFICE O/P EST LOW 20 MIN: CPT | Mod: PBBFAC

## 2024-12-03 PROCEDURE — 80069 RENAL FUNCTION PANEL: CPT

## 2024-12-03 PROCEDURE — 87086 URINE CULTURE/COLONY COUNT: CPT

## 2024-12-03 PROCEDURE — 81001 URINALYSIS AUTO W/SCOPE: CPT

## 2024-12-03 PROCEDURE — 84439 ASSAY OF FREE THYROXINE: CPT

## 2024-12-03 PROCEDURE — 85025 COMPLETE CBC W/AUTO DIFF WBC: CPT

## 2024-12-03 RX ORDER — THYROID 15 MG/1
15 TABLET ORAL
COMMUNITY
End: 2024-12-04

## 2024-12-03 NOTE — PROGRESS NOTES
[facility] Endocrine  OUTPATIENT OFFICE VISIT NOTE    SUBJECTIVE:      HPI: Raza Cerda is a 34 y.o. yo female w/ PMH of Stage-I Micropapillary Thyroid Carcinoma (s/p total thyroidectomy, 5/2020) w/ post-surgical hypothyroidism & hypoparathyroidism who presents to the endocrine clinic for follow-up. Patient last seen 8/2023.  At that time; [place] thyroid was increased from 75 mg to 90 mg q.d..  Calcitriol was continued at 0.25 mg q.d. as that time patient denied any muscle tingling or cramps and serum calcium range between 7.7-8.4.    Today; patient reports feeling well overall since last office visit. She was started on Cytomel 10 mg daily due to complains of fatigue and states that she feels better since then. She was switched from npthyroid to synthroid 112 mcg po daily. She was taking the calcitriol only once a day because she didn't know that she was supposed to take it twice a day.    She had neck US that didn't show any residual thyroid tissue, she forgot to do her labs and was instructed to do them after this visit    In regards to her thyroid cancer; patient underwent ultrasound surveillance (10/16/2023) which revealed 2 small foci thigh of homogeneous tissue within thyroidectomy bed that were not definitively seen on prior exam (02/2023).  FNA was ordered for further evaluation however biopsy was unable to be completed secondary to close proximity to underlying vasculature.  Over this timeframe, patient denies any noticeable swellings to the thyroid region, regional adenopathy, unexpected weight loss, night sweats, fever/chills, or appetite changes/early satiety.    ROS:  10 point ROS performed negative other than stated above       OBJECTIVE:     Vital signs:   There were no vitals taken for this visit.     Physical Examination:  General:  Well-nourished, well-developed female, no  HEENT: PERRL; nasal and oral mucosa moist and clear; anterior central post thyroidectomy scar, well healed, without any  appreciable scar tissue.  No regional adenopathy appreciated  Pulm: CTA bilaterally, normal work of breathing  CV: S1, S2 w/o murmurs or gallops; no edema noted  GI: Soft with normal bowel sounds, nontender to follow up outpatient  Neuro: AAOx4; CN II-XII grossly intact  Psych: Cooperative; appropriate mood and affect     ASSESSMENT & PLAN:   1) Micropapillary Thyroid Carcinoma        -- s/p total thyroidectomy (05/2020)  2) Hypothyroidism        -- 2/2 to #1  3) Hypoparathyroidism        -- 2/2 to #1  - TSH trend (0.20-> 4.08-> 4.00-> 0.47-> 0.37)  - thyroglobulin antibody trend (serially <1.8)  - thyroglobulin tumor marker trend (0.9->3.8-> 1.4)  - synthroid thyroid 112 mcg q.d. and Cytomel 10 mg q.d  - current calcitriol regimen 0.25 mg b.i.d. with 1000 mg OTC calcium supplementation p.r.n. for hypocalcemic like symptoms  - we will obtain repeat Chem panel today  - recent ultrasound thyroid with no residual thyroid tissue  - she has indeterminate response to treatment; TG 0.2-5, target TSH 0.1-0.5    Health Maintenance:  Immunization History   Administered Date(s) Administered    DTP 1991, 1991, 1991, 11/10/1992    DTaP 10/02/1997    HPV 9-Valent 02/15/2007, 04/17/2007, 10/09/2008    HPV Quadrivalent 02/15/2007, 04/17/2007, 10/09/2008    Hepatitis B 10/20/1997, 11/19/1997, 05/12/1998    Influenza - Quadrivalent - PF *Preferred* (6 months and older) 09/22/2015, 01/24/2024    Influenza - Trivalent - Fluarix, Flulaval, Fluzone, Afluria - PF 09/22/2015    Influenza A (H1N1) 2009 Monovalent - Intranasal 11/19/2009    MMR 06/08/1992, 08/04/1999, 10/12/1999    Meningococcal Conjugate (MCV4P) 02/16/2006    OPV 08/04/1999, 10/12/1999, 06/14/2000    PPD Test 08/02/1999    Td (ADULT) 04/13/2004    Tdap 12/06/2015     Return to clinic in 4 months.      Jordon Martinez MD  [employer] Internal Medicine PGY-2

## 2024-12-03 NOTE — TELEPHONE ENCOUNTER
Labs are back.      TSH is below goal, MCV on CBC was elevated rest were acceptable.    Continue current dose of LT3.    Reduce erx LT4 from current 112 to 100mcg po daily, #90, no refills.    Plan for repeat labs in 5-6 weeks including Tg panel, TSH, Ft4, Ft3, renal panel, b12, folate.

## 2024-12-03 NOTE — TELEPHONE ENCOUNTER
Please have the lab run the labs I wanted, namely TSH, FT4, FT3, thyroglobulin panel, renal panel (looks like they will just be able to add a phos for this one).    If they cannot at the tg panel, do not make pt come back, will see if can catch it with next round of labs based on results.

## 2024-12-03 NOTE — TELEPHONE ENCOUNTER
Please proceed with adding them.      I'll sort the tg with a future draw.    MCV was up on CBC.  If possible, would also add a b12.

## 2024-12-03 NOTE — HISTORICAL [facility] CERNER
This is a historical note converted from [place]. Formatting and pictures may have been removed.  Please reference [place] for original formatting and attached multimedia. Chief Complaint  Thyroid nodules, biopsied as suspicious for papillary thyroid carcinoma  History of Present Illness  29yoF referred for thyroid nodules. States she had u/s r/t concern over noticeable neck fullness. She requested thyroid labs last year?after concerns for hypothyroidism?bc of?fatigue, skin changes,?and?hair loss (which were WNL). High anxiety over cancer as father  last?year of metastatic CA of unknown primary origin. No family hx of thyroid disease. No radiation exposure or smoking hx. Denies dysphagia, odynophagia, or SOB.  ?   20: Here today for surgery. Denies any changes to health since last clinic visit.  Review of Systems  Negative except per HPI  Physical Exam  Vitals & Measurements  T:?37.0? ?C (Oral)? HR:?114(Peripheral)? BP:?129/85? SpO2:?98%? WT:?66.9?kg? BMI:?21.84?  General: well-developed well-nourished, NAD  Eye: PERRLA, EOMI  Ear: external ears normal, grossly normal hearing  Nose: no gross external deformity.  OC/OP: moist mucous membranes, tonsils 1+ bilaterally without exudate or erythema  Neck: soft, supple, no lymphadenopathy, right thyroid fullness  Respiratory: non labored breathing on RA, no accessory muscle use, no stridor  Cardiovascular: RR  Integumentary: no rashes or skin lesions present  Neurologic: cranial nerves II-XII intact  Assessment/Plan  29yoF with bilateral thyroid nodules s/p FNA of right upper and right lower thyroid nodules 3/, biopsied suspicious for papillary thyroid carcinoma.  ?   - OR for total thyroidectomy today  - Will admit for overnight observation   Problem List/Past Medical History  Ongoing  Acute pharyngitis, unspecified  Multiple thyroid nodules  Papillary thyroid carcinoma  Historical  Anxiety  None  Pregnant  Procedure/Surgical History  Delivery of Products of  Conception, External Approach (12/05/2015)  Repair Perineum Muscle, Open Approach (12/05/2015)  Extraction of wisdom tooth  Tonsillectomy   Medications  Inpatient  Normal Saline (0.9% NS) IV 1,000 mL, 1000 mL, IV  Home  No active home medications  Allergies  penicillins  Social History  Abuse/Neglect  No, No, Yes, 05/08/2020  No, 05/05/2020  No, No, Yes, 03/05/2020  Alcohol - Denies Alcohol Use, 12/05/2015  Past, 05/05/2020  Past, 02/13/2020  Employment/School  Employed, Activity level: Desk/Office. Highest education level: Some college. Operates hazardous equipment: No., 06/14/2018  Exercise  Self assessment: Good condition., 03/23/2018  Home/Environment  Lives with Children, Significant other. Living situation: Home/Independent. Alcohol abuse in household: No. Substance abuse in household: No. Smoker in household: No. Injuries/Abuse/Neglect in household: No. Feels unsafe at home: No. Safe place to go: Yes. Family/Friends available for support: Yes. Concern for family members at home: No. Major illness in household: No. Financial concerns: No. TV/Computer concerns: No., 06/14/2018  Nutrition/Health  Regular, Fair, 02/13/2020  Sexual  Sexually active: Yes., 02/06/2019  Substance Use - Denies Substance Abuse, 12/05/2015  Never, 03/23/2018  Tobacco - Denies Tobacco Use, 12/05/2015  Never (less than 100 in lifetime), No, Household tobacco concerns: No., 05/08/2020  Never (less than 100 in lifetime), No, 05/05/2020  Never (less than 100 in lifetime), N/A, 03/05/2020  Family History  Hepatitis C.: Mother.  Primary malignant neoplasm of breast: Paternal Grandmother.  Immunizations  Vaccine Date Status   tetanus/diphtheria/pertussis, acel(Tdap) 12/06/2015 Given   Diagnostic Results  Thyroid ultrasound:  FINDINGS  ?  A right lower cervical lymph node measures 7 x 4 mm.  ?  A left lower cervical lymph node measures 5 x 3 mm with a normal fatty  hilum. A left upper cervical lymph node measures 7 x 4 mm with a  normal fatty  hilum.  ?  There are no lymph nodes identified in the lower central compartment.  A submental lymph node measures 8 x 4 mm with a normal fatty hilum.  ?  There are no calcified or cystic lymph nodes identified.  ?  IMPRESSION  Small cervical lymph nodes as described.  ?  ?  Path Results:  1. Right lower thyroid nodule, Fine needle aspiration:  - Cytomorphology suspicious for papillary thyroid carcinoma.  ?  2. Right upper thyroid nodule, Fine needle aspiration:  - Cytomorphology suspicious for papillary thyroid carcinoma.  ?      social work

## 2024-12-04 RX ORDER — LEVOTHYROXINE SODIUM 100 UG/1
100 TABLET ORAL
Qty: 90 TABLET | Refills: 0 | Status: SHIPPED | OUTPATIENT
Start: 2024-12-04 | End: 2025-03-04

## 2024-12-05 LAB — BACTERIA UR CULT: NO GROWTH

## 2025-01-16 DIAGNOSIS — E89.2 POSTSURGICAL HYPOPARATHYROIDISM: ICD-10-CM

## 2025-01-16 DIAGNOSIS — E89.0 POSTSURGICAL HYPOTHYROIDISM: ICD-10-CM

## 2025-01-16 DIAGNOSIS — C73 THYROID CANCER: ICD-10-CM

## 2025-01-16 RX ORDER — CALCITRIOL 0.25 UG/1
0.25 CAPSULE ORAL 2 TIMES DAILY
Qty: 60 CAPSULE | Refills: 6 | Status: SHIPPED | OUTPATIENT
Start: 2025-01-16

## 2025-02-06 DIAGNOSIS — E89.0 POSTSURGICAL HYPOTHYROIDISM: ICD-10-CM

## 2025-02-06 DIAGNOSIS — Z85.850 HISTORY OF PAPILLARY ADENOCARCINOMA OF THYROID: ICD-10-CM

## 2025-02-07 RX ORDER — LIOTHYRONINE SODIUM 5 UG/1
TABLET ORAL
Qty: 60 TABLET | Refills: 3 | Status: SHIPPED | OUTPATIENT
Start: 2025-02-07

## 2025-02-07 NOTE — TELEPHONE ENCOUNTER
----- Message from Seda sent at 2/6/2025 12:23 PM CST -----  Pt of         Pt called requesting a PA for medication CYTOMEL 5 mcg Tab.      Pt call back number  [phone]        Please advise

## 2025-02-07 NOTE — TELEPHONE ENCOUNTER
Refill request initiated through covermymeds, received response that cytomel 5mg tablet is covered for this beneficiary without prior authorization.    Left voice message for patient regarding the above.

## 2025-04-16 ENCOUNTER — TELEPHONE (OUTPATIENT)
Dept: ENDOCRINOLOGY | Facility: CLINIC | Age: 35
End: 2025-04-16
Payer: MEDICAID

## 2025-04-16 NOTE — TELEPHONE ENCOUNTER
----- Message from Rabia sent at 4/15/2025 10:55 AM CDT -----  Patient of Dr Arnold Patient called requesting refills on levothyroxine I advised patient that  labs are needed before medication can be refilled Per Dr Arnold note on 3/11/2025  [id number]

## 2025-04-16 NOTE — TELEPHONE ENCOUNTER
Phoned patient left voice message asking her to have labs drawn to see if her LT4 needs adjusting.

## 2025-04-21 ENCOUNTER — LAB VISIT (OUTPATIENT)
Dept: LAB | Facility: HOSPITAL | Age: 35
End: 2025-04-21
Attending: INTERNAL MEDICINE
Payer: MEDICAID

## 2025-04-21 DIAGNOSIS — D51.9 ANEMIA DUE TO VITAMIN B12 DEFICIENCY, UNSPECIFIED B12 DEFICIENCY TYPE: ICD-10-CM

## 2025-04-21 DIAGNOSIS — C73 PAPILLARY CARCINOMA OF THYROID: ICD-10-CM

## 2025-04-21 LAB
ALBUMIN SERPL-MCNC: 4.3 G/DL (ref 3.5–5)
BUN SERPL-MCNC: 13.6 MG/DL (ref 7–18.7)
CALCIUM SERPL-MCNC: 8.5 MG/DL (ref 8.4–10.2)
CHLORIDE SERPL-SCNC: 104 MMOL/L (ref 98–107)
CO2 SERPL-SCNC: 27 MMOL/L (ref 22–29)
CREAT SERPL-MCNC: 0.79 MG/DL (ref 0.55–1.02)
FOLATE SERPL-MCNC: 10.9 NG/ML (ref 7–31.4)
GFR SERPLBLD CREATININE-BSD FMLA CKD-EPI: >60 ML/MIN/1.73/M2
GLUCOSE SERPL-MCNC: 87 MG/DL (ref 74–100)
PHOSPHATE SERPL-MCNC: 4 MG/DL (ref 2.3–4.7)
POTASSIUM SERPL-SCNC: 3.5 MMOL/L (ref 3.5–5.1)
SODIUM SERPL-SCNC: 141 MMOL/L (ref 136–145)
T3FREE SERPL-MCNC: 3.43 PG/ML (ref 1.58–3.91)
T4 FREE SERPL-MCNC: 0.92 NG/DL (ref 0.7–1.48)
TSH SERPL-ACNC: 0.48 UIU/ML (ref 0.35–4.94)
VIT B12 SERPL-MCNC: 600 PG/ML (ref 213–816)

## 2025-04-21 PROCEDURE — 84432 ASSAY OF THYROGLOBULIN: CPT

## 2025-04-21 PROCEDURE — 82746 ASSAY OF FOLIC ACID SERUM: CPT

## 2025-04-21 PROCEDURE — 80069 RENAL FUNCTION PANEL: CPT

## 2025-04-21 PROCEDURE — 36415 COLL VENOUS BLD VENIPUNCTURE: CPT

## 2025-04-21 PROCEDURE — 84443 ASSAY THYROID STIM HORMONE: CPT

## 2025-04-21 PROCEDURE — 84481 FREE ASSAY (FT-3): CPT

## 2025-04-21 PROCEDURE — 82607 VITAMIN B-12: CPT

## 2025-04-21 PROCEDURE — 84439 ASSAY OF FREE THYROXINE: CPT

## 2025-04-22 LAB
ENDOCRINOLOGIST REVIEW: NORMAL
THYROGLOB AB SERPL IA-ACNC: <1.8 IU/ML
THYROGLOB SERPL-MCNC: 2.6 NG/ML

## 2025-05-16 DIAGNOSIS — Z85.850 HISTORY OF MALIGNANT NEOPLASM OF THYROID: ICD-10-CM

## 2025-05-16 DIAGNOSIS — E03.9 HYPOTHYROIDISM, UNSPECIFIED TYPE: ICD-10-CM

## 2025-05-16 RX ORDER — LEVOTHYROXINE SODIUM 100 UG/1
100 TABLET ORAL
Qty: 30 TABLET | Refills: 0 | Status: SHIPPED | OUTPATIENT
Start: 2025-05-16

## 2025-06-09 DIAGNOSIS — Z85.850 HISTORY OF PAPILLARY ADENOCARCINOMA OF THYROID: ICD-10-CM

## 2025-06-09 DIAGNOSIS — E89.0 POSTSURGICAL HYPOTHYROIDISM: ICD-10-CM

## 2025-06-09 RX ORDER — LIOTHYRONINE SODIUM 5 UG/1
10 TABLET ORAL
Qty: 60 TABLET | Refills: 3 | Status: SHIPPED | OUTPATIENT
Start: 2025-06-09 | End: 2025-06-11

## 2025-06-11 ENCOUNTER — OFFICE VISIT (OUTPATIENT)
Dept: ENDOCRINOLOGY | Facility: CLINIC | Age: 35
End: 2025-06-11
Payer: MEDICAID

## 2025-06-11 VITALS
HEART RATE: 76 BPM | SYSTOLIC BLOOD PRESSURE: 115 MMHG | WEIGHT: 145 LBS | BODY MASS INDEX: 21.48 KG/M2 | DIASTOLIC BLOOD PRESSURE: 81 MMHG | TEMPERATURE: 98 F | HEIGHT: 69 IN | RESPIRATION RATE: 18 BRPM

## 2025-06-11 DIAGNOSIS — C73 THYROID CANCER: Primary | ICD-10-CM

## 2025-06-11 DIAGNOSIS — E89.2 POSTSURGICAL HYPOPARATHYROIDISM: ICD-10-CM

## 2025-06-11 DIAGNOSIS — E89.0 POSTSURGICAL HYPOTHYROIDISM: ICD-10-CM

## 2025-06-11 PROCEDURE — 99213 OFFICE O/P EST LOW 20 MIN: CPT | Mod: PBBFAC | Performed by: INTERNAL MEDICINE

## 2025-06-11 RX ORDER — LEVOTHYROXINE SODIUM 137 UG/1
1 CAPSULE ORAL DAILY
Qty: 90 CAPSULE | Refills: 1 | Status: SHIPPED | OUTPATIENT
Start: 2025-06-11 | End: 2026-06-11

## 2025-06-11 RX ORDER — LEVOTHYROXINE SODIUM 100 UG/1
1 CAPSULE ORAL DAILY
Qty: 90 CAPSULE | Refills: 1 | Status: SHIPPED | OUTPATIENT
Start: 2025-06-11 | End: 2025-06-11

## 2025-06-11 NOTE — PROGRESS NOTES
"[facility] Endocrine  OUTPATIENT OFFICE VISIT NOTE    SUBJECTIVE:      HPI: Raza Cerda is a 34 y.o. yo female w/ PMH of Stage-I Micropapillary Thyroid Carcinoma (s/p total thyroidectomy, 5/2020) w/ post-surgical hypothyroidism & hypoparathyroidism who presents to the endocrine clinic for follow-up. Patient last seen 11/2024.    Today; patient reports feeling well overall since last office visit. Her only complaint is mild persistent fatigue. She denies any noticeable swellings to the thyroid region, regional adenopathy, unexpected weight loss, night sweats, fever/chills, or appetite changes/early satiety, cold/heat intolerance, change in bowel habits. She is currently taking Cytomel 10 mg daily for the fatigue but says she does not believes it has helped. She is currently taking synthroid 100 mcg po daily and calcitriol twice a day. She states she took Tirosint in the past and requested to switch back instead of the synthroid.    Previous neck US (10/24) didn't show any residual thyroid tissue, and previous TSH and tumor markers were WNL.       ROS:  10 point ROS performed negative other than stated above       OBJECTIVE:     Vital signs:   /81 (BP Location: Right arm, Patient Position: Sitting)   Pulse 76   Temp 98.4 °F (36.9 °C) (Oral)   Resp 18   Ht 5' 9" (1.753 m)   Wt 65.8 kg (145 lb)   LMP 06/04/2025   BMI 21.41 kg/m²      Physical Examination:  Physical Exam  Constitutional:       Appearance: Normal appearance. She is normal weight.   HENT:      Head: Normocephalic.      Comments:  PERRL; nasal and oral mucosa moist and clear; anterior central post thyroidectomy scar, well healed, without any appreciable scar tissue.  No regional adenopathy appreciated     Mouth/Throat:      Mouth: Mucous membranes are moist.      Pharynx: Oropharynx is clear.   Eyes:      Pupils: Pupils are equal, round, and reactive to light.   Cardiovascular:      Rate and Rhythm: Normal rate and regular rhythm.      " Pulses: Normal pulses.      Heart sounds: Normal heart sounds.   Pulmonary:      Effort: Pulmonary effort is normal.      Breath sounds: Normal breath sounds.   Abdominal:      General: Abdomen is flat. Bowel sounds are normal.      Palpations: Abdomen is soft.   Musculoskeletal:      Cervical back: Normal range of motion.   Skin:     General: Skin is warm and dry.      Capillary Refill: Capillary refill takes less than 2 seconds.   Neurological:      General: No focal deficit present.      Mental Status: She is alert.   Psychiatric:         Mood and Affect: Mood normal.         Behavior: Behavior normal.           ASSESSMENT & PLAN:   1) Micropapillary Thyroid Carcinoma        -- s/p total thyroidectomy (05/2020)  2) Hypothyroidism        -- 2/2 to #1  3) Hypoparathyroidism        -- 2/2 to #1  - TSH trend (0.20-> 4.08-> 4.00-> 0.47-> 0.37 -> .47)  - thyroglobulin antibody trend (serially <1.8)  - thyroglobulin tumor marker trend (0.9->3.8-> 1.4 -> 2.6)  - recent ultrasound thyroid with no residual thyroid tissue (10/22/24)  - Discontinue synthroid thyroid 100 mcg q.d. and Cytomel 10 mg q.d; Order Tirosint 137 mcg q.d.  - Continue current calcitriol regimen 0.25 mg b.i.d. with 1000 mg OTC calcium supplementation p.r.n. for hypocalcemic like symptoms  - Complete labs: TSH, Thyroglobulin, T3, T4, and Renal Panel prior to next visit  - Complete US prior to next visit, if normal will discontinue serial ultrasounds given 5 years s/p thyroidectomy  - she has indeterminate response to treatment; TG 0.2-5, target TSH 0.1-0.5      Return to clinic in 3 months.      Jude Chairez, MS3  [facility] Internal Medicine

## 2025-06-12 DIAGNOSIS — E89.0 POSTSURGICAL HYPOTHYROIDISM: Primary | ICD-10-CM

## 2025-06-12 NOTE — TELEPHONE ENCOUNTER
----- Message from Ottoniel Arnold MD sent at 6/12/2025  2:03 PM CDT -----  Do we have Tirosint on the [facility] cash formulary?  ----- Message -----  From: Jacquelyn Barnhart  Sent: 6/12/2025  10:14 AM CDT  To: Ottoniel Arnold MD    Good Morning,Patient: Raza RaiKillianOB: 1990Drug: Tirosint 137mcg CapsuleMagellan DENIED requestPlease advise,Jacquelyn [facility] [phone]

## 2025-06-12 NOTE — TELEPHONE ENCOUNTER
Please clarify with pt if she is willing to pay cash for it and use good rx or a savings card from the  to help vs stick with her old regimen.

## 2025-06-17 RX ORDER — LEVOTHYROXINE SODIUM 137 UG/1
137 CAPSULE ORAL DAILY
Qty: 90 CAPSULE | Refills: 1 | Status: SHIPPED | OUTPATIENT
Start: 2025-06-17 | End: 2025-06-20

## 2025-06-17 NOTE — TELEPHONE ENCOUNTER
Tirosint is still out of her price range through [employer], she can get a manufacture coupon where she pays $60 for a 90 day supply but it has to be a 90 day supply.

## 2025-06-20 DIAGNOSIS — E89.0 POSTSURGICAL HYPOTHYROIDISM: Primary | ICD-10-CM

## 2025-06-20 RX ORDER — LEVOTHYROXINE SODIUM 100 UG/1
100 TABLET ORAL
Qty: 30 TABLET | Refills: 5 | Status: SHIPPED | OUTPATIENT
Start: 2025-06-20 | End: 2026-06-20

## 2025-06-20 RX ORDER — LIOTHYRONINE SODIUM 5 UG/1
10 TABLET ORAL DAILY
Qty: 60 TABLET | Refills: 5 | Status: SHIPPED | OUTPATIENT
Start: 2025-06-20 | End: 2026-06-20

## 2025-06-20 NOTE — TELEPHONE ENCOUNTER
Phoned patient left voice message.    Dr. Arnold do you want to reorder the Synthroid 100mcg QD and Cytomel 10mg QD?

## 2025-06-20 NOTE — TELEPHONE ENCOUNTER
----- Message from Seda sent at 6/20/2025 11:40 AM CDT -----  Pt of       Pt called stating her thyroid medication is still very pricey and requesting to switch medication.      Pt requesting a call back as well.      Pt number  [phone]      Please advise

## 2025-07-31 ENCOUNTER — OFFICE VISIT (OUTPATIENT)
Dept: ENDOCRINOLOGY | Facility: CLINIC | Age: 35
End: 2025-07-31
Payer: MEDICAID

## 2025-07-31 VITALS
HEART RATE: 93 BPM | SYSTOLIC BLOOD PRESSURE: 123 MMHG | TEMPERATURE: 98 F | HEIGHT: 69 IN | RESPIRATION RATE: 12 BRPM | DIASTOLIC BLOOD PRESSURE: 80 MMHG | WEIGHT: 145.5 LBS | BODY MASS INDEX: 21.55 KG/M2

## 2025-07-31 DIAGNOSIS — E89.2 POSTSURGICAL HYPOPARATHYROIDISM: ICD-10-CM

## 2025-07-31 DIAGNOSIS — C73 THYROID CANCER: Primary | ICD-10-CM

## 2025-07-31 DIAGNOSIS — E89.0 POSTSURGICAL HYPOTHYROIDISM: ICD-10-CM

## 2025-07-31 PROCEDURE — 99213 OFFICE O/P EST LOW 20 MIN: CPT | Mod: PBBFAC

## 2025-07-31 RX ORDER — LIOTHYRONINE SODIUM 5 UG/1
10 TABLET ORAL DAILY
Qty: 180 TABLET | Refills: 0 | Status: SHIPPED | OUTPATIENT
Start: 2025-07-31 | End: 2026-07-31

## 2025-07-31 RX ORDER — LEVOTHYROXINE SODIUM 137 UG/1
CAPSULE ORAL
COMMUNITY
End: 2025-07-31

## 2025-07-31 RX ORDER — LEVOTHYROXINE SODIUM 100 UG/1
100 TABLET ORAL
Qty: 90 TABLET | Refills: 0 | Status: SHIPPED | OUTPATIENT
Start: 2025-07-31 | End: 2026-07-31

## 2025-07-31 NOTE — PROGRESS NOTES
Subjective:      Patient ID: Raza Cerda is a 35 y.o. female.    Chief Complaint:  Fatigue.      History of Present Illness  Pt is a 34 y/o w/ Pmhx of differentiated thyroid cancer s/p thyroidectomy w/ resulting postsurgical hypothyroidism and hypoparathyroidism.  Pt previously was on amour/NP thyroid but had difficulty keeping TFTs at goal for cancer.  Transitioned to dual therapy vs monotherapy.  Last visit pt had c/o fatigue and concerns that didn't feels as well.  Pt recalled feeling best on tirosint so rx.  Wasn't covered so pt paid cash.  Has now been out the past few days. Explains her PCP also started her on lexapro for depression which has helped.  Is unsure which direction to go with her thyroid hormone replacement.  Endorses she has cancer surveillance u/s booked next month.    Review of Systems   Constitutional:  Positive for fatigue.       Objective:   Physical Exam  Vitals reviewed.   Constitutional:       Appearance: Normal appearance.   HENT:      Head: Normocephalic and atraumatic.   Neurological:      Mental Status: She is alert.   Psychiatric:         Mood and Affect: Mood normal.         Behavior: Behavior normal.       Lab Review:   Reviewed TSH, FT4, FT3 4/25 and 12/24.  Last tg was 4/25 and last u/s was in 2024    Assessment:     1. Thyroid cancer  liothyronine (CYTOMEL) 5 MCG Tab    levothyroxine (SYNTHROID) 100 MCG tablet    TSH    T4, Free    T3, Free ([facility])    Thyroglobulin      2. Postsurgical hypothyroidism  liothyronine (CYTOMEL) 5 MCG Tab    levothyroxine (SYNTHROID) 100 MCG tablet    TSH    T4, Free    T3, Free ([facility])    Thyroglobulin      3. Postsurgical hypoparathyroidism  liothyronine (CYTOMEL) 5 MCG Tab    levothyroxine (SYNTHROID) 100 MCG tablet    TSH    T4, Free    T3, Free ([facility])    Thyroglobulin    Renal Function Panel           Plan:     Thyroid cancer  Will peform surveillance via labs and imaging prior to f/u  -     liothyronine (CYTOMEL) 5 MCG Tab; Take 2 tablets (10  mcg total) by mouth once daily.  Dispense: 180 tablet; Refill: 0  -     levothyroxine (SYNTHROID) 100 MCG tablet; Take 1 tablet (100 mcg total) by mouth before breakfast.  Dispense: 90 tablet; Refill: 0  -     TSH; Future; Expected date: 09/11/2025  -     T4, Free; Future; Expected date: 09/11/2025  -     T3, Free ([facility]); Future; Expected date: 09/11/2025  -     Thyroglobulin; Future; Expected date: 09/11/2025    Postsurgical hypothyroidism  Discussed further mgmt options to keep TSH at goal for cancer, pt settled on going back on dual therapy.  -     liothyronine (CYTOMEL) 5 MCG Tab; Take 2 tablets (10 mcg total) by mouth once daily.  Dispense: 180 tablet; Refill: 0  -     levothyroxine (SYNTHROID) 100 MCG tablet; Take 1 tablet (100 mcg total) by mouth before breakfast.  Dispense: 90 tablet; Refill: 0  -     TSH; Future; Expected date: 09/11/2025  -     T4, Free; Future; Expected date: 09/11/2025  -     T3, Free ([facility]); Future; Expected date: 09/11/2025  -     Thyroglobulin; Future; Expected date: 09/11/2025    Postsurgical hypoparathyroidism  Regroup via labs prior to f/u  -     liothyronine (CYTOMEL) 5 MCG Tab; Take 2 tablets (10 mcg total) by mouth once daily.  Dispense: 180 tablet; Refill: 0  -     levothyroxine (SYNTHROID) 100 MCG tablet; Take 1 tablet (100 mcg total) by mouth before breakfast.  Dispense: 90 tablet; Refill: 0  -     TSH; Future; Expected date: 09/11/2025  -     T4, Free; Future; Expected date: 09/11/2025  -     T3, Free ([facility]); Future; Expected date: 09/11/2025  -     Thyroglobulin; Future; Expected date: 09/11/2025  -     Renal Function Panel; Future; Expected date: 09/11/2025     F/u in 6-8 weeks w/ labs prior, u/s prior as scheduled